# Patient Record
Sex: FEMALE | Race: WHITE | NOT HISPANIC OR LATINO | Employment: OTHER | URBAN - METROPOLITAN AREA
[De-identification: names, ages, dates, MRNs, and addresses within clinical notes are randomized per-mention and may not be internally consistent; named-entity substitution may affect disease eponyms.]

---

## 2021-10-07 ENCOUNTER — APPOINTMENT (OUTPATIENT)
Dept: WOMENS IMAGING | Facility: HOSPITAL | Age: 76
End: 2021-10-07

## 2021-10-07 PROCEDURE — 77067 SCR MAMMO BI INCL CAD: CPT | Performed by: RADIOLOGY

## 2022-07-28 RX ORDER — PAROXETINE HYDROCHLORIDE 20 MG/1
TABLET, FILM COATED ORAL
Qty: 53 TABLET | Refills: 0 | Status: SHIPPED | OUTPATIENT
Start: 2022-07-28 | End: 2022-08-15 | Stop reason: SDUPTHER

## 2022-08-15 ENCOUNTER — OFFICE VISIT (OUTPATIENT)
Dept: FAMILY MEDICINE CLINIC | Facility: CLINIC | Age: 77
End: 2022-08-15

## 2022-08-15 VITALS
DIASTOLIC BLOOD PRESSURE: 80 MMHG | TEMPERATURE: 97.8 F | HEART RATE: 76 BPM | BODY MASS INDEX: 25.34 KG/M2 | SYSTOLIC BLOOD PRESSURE: 126 MMHG | OXYGEN SATURATION: 95 % | HEIGHT: 60 IN | WEIGHT: 129.08 LBS

## 2022-08-15 DIAGNOSIS — E03.9 ACQUIRED HYPOTHYROIDISM: ICD-10-CM

## 2022-08-15 DIAGNOSIS — E55.9 VITAMIN D DEFICIENCY: ICD-10-CM

## 2022-08-15 DIAGNOSIS — F41.9 ANXIETY: ICD-10-CM

## 2022-08-15 DIAGNOSIS — Z12.31 ENCOUNTER FOR SCREENING MAMMOGRAM FOR MALIGNANT NEOPLASM OF BREAST: ICD-10-CM

## 2022-08-15 DIAGNOSIS — J30.1 SEASONAL ALLERGIC RHINITIS DUE TO POLLEN: ICD-10-CM

## 2022-08-15 DIAGNOSIS — Z00.00 ENCOUNTER FOR WELLNESS EXAMINATION IN ADULT: Primary | ICD-10-CM

## 2022-08-15 PROBLEM — R73.03 PREDIABETES: Status: ACTIVE | Noted: 2021-04-21

## 2022-08-15 PROCEDURE — 1170F FXNL STATUS ASSESSED: CPT | Performed by: FAMILY MEDICINE

## 2022-08-15 PROCEDURE — 1160F RVW MEDS BY RX/DR IN RCRD: CPT | Performed by: FAMILY MEDICINE

## 2022-08-15 PROCEDURE — G0439 PPPS, SUBSEQ VISIT: HCPCS | Performed by: FAMILY MEDICINE

## 2022-08-15 PROCEDURE — 99397 PER PM REEVAL EST PAT 65+ YR: CPT | Performed by: FAMILY MEDICINE

## 2022-08-15 RX ORDER — LEVOTHYROXINE SODIUM 0.05 MG/1
TABLET ORAL
COMMUNITY
Start: 2022-06-22

## 2022-08-15 RX ORDER — PAROXETINE HYDROCHLORIDE 20 MG/1
20 TABLET, FILM COATED ORAL DAILY
Qty: 90 TABLET | Refills: 1 | Status: SHIPPED | OUTPATIENT
Start: 2022-08-15 | End: 2023-03-20

## 2022-08-15 RX ORDER — ATORVASTATIN CALCIUM 20 MG/1
20 TABLET, FILM COATED ORAL NIGHTLY
COMMUNITY
Start: 2022-06-22

## 2022-08-15 RX ORDER — AZITHROMYCIN 250 MG/1
250 TABLET, FILM COATED ORAL 3 TIMES WEEKLY
COMMUNITY

## 2022-08-15 RX ORDER — FEXOFENADINE HCL 180 MG/1
180 TABLET ORAL DAILY
Qty: 90 TABLET | Refills: 3 | Status: SHIPPED | OUTPATIENT
Start: 2022-08-15

## 2022-08-15 NOTE — PROGRESS NOTES
QUICK REFERENCE INFORMATION:  The ABCs of the Annual Wellness Visit    Subsequent Medicare Wellness Visit    @[email or web address]@    HEALTH RISK ASSESSMENT    1945    Recent Hospitalizations:  No hospitalization(s) within the last year..        Current Medical Providers:  Patient Care Team:  Arlette Jensen DO as PCP - General (Family Medicine)  Arlette Jensen DO (Family Medicine)        Smoking Status:  Social History     Tobacco Use   Smoking Status Former Smoker   • Packs/day: 0.25   • Years: 3.00   • Pack years: 0.75   • Types: Cigarettes   • Quit date:    • Years since quittin.6   Smokeless Tobacco Never Used       Alcohol Consumption:  Social History     Substance and Sexual Activity   Alcohol Use Never       Depression Screen:   PHQ-2/PHQ-9 Depression Screening 8/15/2022   Little Interest or Pleasure in Doing Things 0-->not at all   Feeling Down, Depressed or Hopeless 0-->not at all   PHQ-9: Brief Depression Severity Measure Score 0       Health Habits and Functional and Cognitive Screening:  Functional & Cognitive Status 8/15/2022   Do you have difficulty preparing food and eating? No   Do you have difficulty bathing yourself, getting dressed or grooming yourself? No   Do you have difficulty using the toilet? No   Do you have difficulty moving around from place to place? No   Do you have trouble with steps or getting out of a bed or a chair? No   Current Diet Well Balanced Diet   Dental Exam Up to date   Eye Exam Up to date   Exercise (times per week) 7 times per week   Current Exercises Include Walking   Do you need help using the phone?  No   Are you deaf or do you have serious difficulty hearing?  No   Do you need help with transportation? No   Do you need help shopping? No   Do you need help preparing meals?  No   Do you need help with housework?  No   Do you need help with laundry? No   Do you need help taking your medications? No   Do you need help managing money? No   Do  you ever drive or ride in a car without wearing a seat belt? No   Have you felt unusual stress, anger or loneliness in the last month? No   Who do you live with? Spouse   If you need help, do you have trouble finding someone available to you? No   Have you been bothered in the last four weeks by sexual problems? No   Do you have difficulty concentrating, remembering or making decisions? No       Fall Risk Screen:  LON Fall Risk Assessment was completed, and patient is at LOW risk for falls.Assessment completed on:8/15/2022    ACE III MINI        Does the patient have evidence of cognitive impairment? No    Aspirin use counseling: Does not need ASA (and currently is not on it)    Recent Lab Results:  CMP:     HbA1c:  No results found for: HGBA1C  Microalbumin:  No results found for: MICROALBUR, POCMALB, POCCREAT  Lipid Panel  No results found for: CHOL, TRIG, HDL, LDL, AST, ALT    Visual Acuity:  No exam data present    Age-appropriate Screening Schedule:  Refer to the list below for future screening recommendations based on patient's age, sex and/or medical conditions. Orders for these recommended tests are listed in the plan section. The patient has been provided with a written plan.    Health Maintenance   Topic Date Due   • TDAP/TD VACCINES (1 - Tdap) Never done   • ZOSTER VACCINE (1 of 2) Never done   • LIPID PANEL  Never done   • INFLUENZA VACCINE  10/01/2022   • DXA SCAN  10/07/2023        Subjective   History of Present Illness    Susie Mancia is a 77 y.o. female who presents for a Subsequent Wellness Visit and annual physical.  Chronic medical conditions include depression, hyperlipidemia, and hypothyroidism.  She denies family history of colon, breast or ovarian cancer.  She is up-to-date on DEXA, mammogram and colon screening.  She would like to discuss a medication for allergies.    CHRONIC CONDITIONS    The following portions of the patient's history were reviewed and updated as appropriate:  allergies, current medications, past family history, past medical history, past social history, past surgical history and problem list.    Outpatient Medications Prior to Visit   Medication Sig Dispense Refill   • atorvastatin (LIPITOR) 20 MG tablet Take 20 mg by mouth Every Night.     • azithromycin (ZITHROMAX) 250 MG tablet Take 250 mg by mouth 3 (Three) Times a Week.     • levothyroxine (SYNTHROID, LEVOTHROID) 50 MCG tablet TAKE 1 TABLET BY MOUTH ONCE DAILY BEFORE BREAKFAST     • PARoxetine (PAXIL) 20 MG tablet Take 1 tablet by mouth once daily 53 tablet 0     No facility-administered medications prior to visit.       Patient Active Problem List   Diagnosis   • Anxiety   • Chest pain   • Gastroesophageal reflux disease   • Hyperlipidemia   • Hypothyroidism   • Irritable bowel syndrome   • Pancreas divisum   • Prediabetes   • Recurrent pancreatitis   • Seasonal allergies   • Encounter for wellness examination in adult   • Encounter for screening mammogram for malignant neoplasm of breast   • Vitamin D deficiency   • Seasonal allergic rhinitis due to pollen       Advance Care Planning:  ACP discussion was held with the patient during this visit. Patient has an advance directive (not in EMR), copy requested.    Identification of Risk Factors:  Risk factors include: Advance Directive Discussion  Breast Cancer/Mammogram Screening  Dementia/Memory   Fall Risk  Osteoporosis Risk.    Review of Systems   Constitutional: Negative for chills and fatigue.   Respiratory: Negative for cough, shortness of breath and wheezing.    Cardiovascular: Negative for chest pain and leg swelling.   Gastrointestinal: Negative for abdominal pain, constipation and diarrhea.   Skin: Negative for rash.   Neurological: Negative for dizziness and headaches.   Psychiatric/Behavioral: The patient is not nervous/anxious.        Compared to one year ago, the patient feels her physical health is the same.  Compared to one year ago, the patient feels  "her mental health is the same.    Objective     Physical Exam  Vitals and nursing note reviewed.   Constitutional:       Appearance: Normal appearance.   HENT:      Head: Normocephalic and atraumatic.   Cardiovascular:      Rate and Rhythm: Normal rate and regular rhythm.      Heart sounds: Normal heart sounds. No murmur heard.  Pulmonary:      Effort: Pulmonary effort is normal.      Breath sounds: Normal breath sounds. No wheezing.   Abdominal:      General: Bowel sounds are normal.      Palpations: Abdomen is soft.   Skin:     General: Skin is warm.   Neurological:      Mental Status: She is alert and oriented to person, place, and time. Mental status is at baseline.   Psychiatric:         Mood and Affect: Mood normal.         Behavior: Behavior normal.          Procedures     Vitals:    08/15/22 1506   BP: 126/80   BP Location: Left arm   Patient Position: Sitting   Cuff Size: Adult   Pulse: 76   Temp: 97.8 °F (36.6 °C)   TempSrc: Temporal   SpO2: 95%   Weight: 58.6 kg (129 lb 1.3 oz)   Height: 152.4 cm (60\")       BMI is >= 25 and <30. (Overweight) The following options were offered after discussion;: exercise counseling/recommendations and nutrition counseling/recommendations      No results found for: WBC, HGB, HCT, MCV, PLT  No results found for: GLUCOSE, BUN, CREATININE, EGFRIFNONA, EGFRIFAFRI, BCR, K, CO2, CALCIUM, PROTENTOTREF, ALBUMIN, LABIL2, BILIRUBIN, AST, ALT  No results found for: TSH  No results found for: PSA  No results found for: CHOL, CHLPL, TRIG, HDL, LDL, LDLDIRECT    Assessment & Plan   Problem List Items Addressed This Visit        Allergies and Adverse Reactions    Seasonal allergic rhinitis due to pollen    Current Assessment & Plan     Rx fexofenadine 180 mg daily.  Side effects discussed         Relevant Medications    fexofenadine (ALLEGRA) 180 MG tablet       Endocrine and Metabolic    Hypothyroidism    Current Assessment & Plan     Stable, she will return for lab work.  Followed by " endocrinology         Relevant Medications    levothyroxine (SYNTHROID, LEVOTHROID) 50 MCG tablet    Vitamin D deficiency    Relevant Orders    Vitamin D 25 Hydroxy       Genitourinary and Reproductive     Encounter for screening mammogram for malignant neoplasm of breast    Relevant Orders    Mammo Screening Bilateral With CAD       Health Encounters    Encounter for wellness examination in adult - Primary    Current Assessment & Plan     She will return for fasting lab work, up-to-date on health maintenance         Relevant Orders    Hemoglobin A1c    CBC Auto Differential    Comprehensive Metabolic Panel    Lipid Panel    TSH    T4, Free       Mental Health    Anxiety    Current Assessment & Plan     Stable on Paxil, refilled         Relevant Medications    PARoxetine (PAXIL) 20 MG tablet        Patient Self-Management and Personalized Health Advice  The patient has been provided with information about: diet, exercise, weight management and fall prevention and preventive services including:   · Annual Wellness Visit (AWV)  · Bone Density Measurements  · Colorectal Cancer Screening, Colonoscopy  · Screening Mammography .    Outpatient Encounter Medications as of 8/15/2022   Medication Sig Dispense Refill   • atorvastatin (LIPITOR) 20 MG tablet Take 20 mg by mouth Every Night.     • azithromycin (ZITHROMAX) 250 MG tablet Take 250 mg by mouth 3 (Three) Times a Week.     • levothyroxine (SYNTHROID, LEVOTHROID) 50 MCG tablet TAKE 1 TABLET BY MOUTH ONCE DAILY BEFORE BREAKFAST     • PARoxetine (PAXIL) 20 MG tablet Take 1 tablet by mouth Daily. 90 tablet 1   • [DISCONTINUED] PARoxetine (PAXIL) 20 MG tablet Take 1 tablet by mouth once daily 53 tablet 0   • fexofenadine (ALLEGRA) 180 MG tablet Take 1 tablet by mouth Daily. 90 tablet 3     No facility-administered encounter medications on file as of 8/15/2022.       Reviewed use of high risk medication in the elderly: yes  Reviewed for potential of harmful drug interactions  in the elderly: not applicable     Diagnoses and all orders for this visit:    1. Encounter for wellness examination in adult (Primary)  Assessment & Plan:  She will return for fasting lab work, up-to-date on health maintenance    Orders:  -     Hemoglobin A1c; Future  -     CBC Auto Differential; Future  -     Comprehensive Metabolic Panel; Future  -     Lipid Panel; Future  -     TSH; Future  -     T4, Free; Future    2. Acquired hypothyroidism  Assessment & Plan:  Stable, she will return for lab work.  Followed by endocrinology      3. Seasonal allergic rhinitis due to pollen  Assessment & Plan:  Rx fexofenadine 180 mg daily.  Side effects discussed    Orders:  -     fexofenadine (ALLEGRA) 180 MG tablet; Take 1 tablet by mouth Daily.  Dispense: 90 tablet; Refill: 3    4. Encounter for screening mammogram for malignant neoplasm of breast  -     Mammo Screening Bilateral With CAD; Future    5. Vitamin D deficiency  -     Vitamin D 25 Hydroxy; Future    6. Anxiety  Assessment & Plan:  Stable on Paxil, refilled    Orders:  -     PARoxetine (PAXIL) 20 MG tablet; Take 1 tablet by mouth Daily.  Dispense: 90 tablet; Refill: 1    Discussed injury prevention, diet and exercise, safe sexual practices, and screening for common diseases. Encouraged use of sunscreen and seatbelts. Encouraged SBE, avoidance of tobacco, limiting alcohol, and yearly dental and eye exams.       Follow Up:  Return in about 1 year (around 8/15/2023) for Annual physical.     There are no Patient Instructions on file for this visit.    An After Visit Summary and PPPS with all of these plans were given to the patient.

## 2022-08-19 PROCEDURE — 36415 COLL VENOUS BLD VENIPUNCTURE: CPT | Performed by: FAMILY MEDICINE

## 2022-08-20 LAB
25(OH)D3+25(OH)D2 SERPL-MCNC: 34.3 NG/ML (ref 30–100)
ALBUMIN SERPL-MCNC: 4.4 G/DL (ref 3.7–4.7)
ALBUMIN/GLOB SERPL: 1.6 {RATIO} (ref 1.2–2.2)
ALP SERPL-CCNC: 91 IU/L (ref 44–121)
ALT SERPL-CCNC: 12 IU/L (ref 0–32)
AST SERPL-CCNC: 24 IU/L (ref 0–40)
BASOPHILS # BLD AUTO: 0 X10E3/UL (ref 0–0.2)
BASOPHILS NFR BLD AUTO: 0 %
BILIRUB SERPL-MCNC: 0.3 MG/DL (ref 0–1.2)
BUN SERPL-MCNC: 21 MG/DL (ref 8–27)
BUN/CREAT SERPL: 21 (ref 12–28)
CALCIUM SERPL-MCNC: 9.6 MG/DL (ref 8.7–10.3)
CHLORIDE SERPL-SCNC: 105 MMOL/L (ref 96–106)
CHOLEST SERPL-MCNC: 157 MG/DL (ref 100–199)
CO2 SERPL-SCNC: 24 MMOL/L (ref 20–29)
CREAT SERPL-MCNC: 1.02 MG/DL (ref 0.57–1)
EGFRCR-CYS SERPLBLD CKD-EPI 2021: 57 ML/MIN/1.73
EOSINOPHIL # BLD AUTO: 0.2 X10E3/UL (ref 0–0.4)
EOSINOPHIL NFR BLD AUTO: 5 %
ERYTHROCYTE [DISTWIDTH] IN BLOOD BY AUTOMATED COUNT: 12.8 % (ref 11.7–15.4)
GLOBULIN SER CALC-MCNC: 2.7 G/DL (ref 1.5–4.5)
GLUCOSE SERPL-MCNC: 96 MG/DL (ref 65–99)
HBA1C MFR BLD: 6.3 % (ref 4.8–5.6)
HCT VFR BLD AUTO: 40.1 % (ref 34–46.6)
HDLC SERPL-MCNC: 52 MG/DL
HGB BLD-MCNC: 12.9 G/DL (ref 11.1–15.9)
IMM GRANULOCYTES # BLD AUTO: 0 X10E3/UL (ref 0–0.1)
IMM GRANULOCYTES NFR BLD AUTO: 0 %
LDLC SERPL CALC-MCNC: 80 MG/DL (ref 0–99)
LYMPHOCYTES # BLD AUTO: 1.5 X10E3/UL (ref 0.7–3.1)
LYMPHOCYTES NFR BLD AUTO: 32 %
MCH RBC QN AUTO: 29.7 PG (ref 26.6–33)
MCHC RBC AUTO-ENTMCNC: 32.2 G/DL (ref 31.5–35.7)
MCV RBC AUTO: 92 FL (ref 79–97)
MONOCYTES # BLD AUTO: 0.3 X10E3/UL (ref 0.1–0.9)
MONOCYTES NFR BLD AUTO: 7 %
NEUTROPHILS # BLD AUTO: 2.6 X10E3/UL (ref 1.4–7)
NEUTROPHILS NFR BLD AUTO: 56 %
PLATELET # BLD AUTO: 255 X10E3/UL (ref 150–450)
POTASSIUM SERPL-SCNC: 4.6 MMOL/L (ref 3.5–5.2)
PROT SERPL-MCNC: 7.1 G/DL (ref 6–8.5)
RBC # BLD AUTO: 4.34 X10E6/UL (ref 3.77–5.28)
SODIUM SERPL-SCNC: 145 MMOL/L (ref 134–144)
T4 FREE SERPL-MCNC: 1.03 NG/DL (ref 0.82–1.77)
TRIGL SERPL-MCNC: 142 MG/DL (ref 0–149)
TSH SERPL DL<=0.005 MIU/L-ACNC: 1.92 UIU/ML (ref 0.45–4.5)
VLDLC SERPL CALC-MCNC: 25 MG/DL (ref 5–40)
WBC # BLD AUTO: 4.6 X10E3/UL (ref 3.4–10.8)

## 2022-08-26 ENCOUNTER — TELEPHONE (OUTPATIENT)
Dept: FAMILY MEDICINE CLINIC | Facility: CLINIC | Age: 77
End: 2022-08-26

## 2023-03-19 DIAGNOSIS — F41.9 ANXIETY: ICD-10-CM

## 2023-03-20 RX ORDER — PAROXETINE HYDROCHLORIDE 20 MG/1
TABLET, FILM COATED ORAL
Qty: 90 TABLET | Refills: 0 | Status: SHIPPED | OUTPATIENT
Start: 2023-03-20

## 2023-04-06 ENCOUNTER — TELEPHONE (OUTPATIENT)
Dept: FAMILY MEDICINE CLINIC | Facility: CLINIC | Age: 78
End: 2023-04-06
Payer: MEDICARE

## 2023-04-06 NOTE — TELEPHONE ENCOUNTER
[facility] CALLED PT TO CANCEL APPT PROVIDER OUT OF OFFICE PLEASE RESCHEDULE PHONE NUMBER NOT WORKING

## 2023-05-24 ENCOUNTER — OFFICE VISIT (OUTPATIENT)
Dept: FAMILY MEDICINE CLINIC | Facility: CLINIC | Age: 78
End: 2023-05-24
Payer: MEDICARE

## 2023-05-24 VITALS
BODY MASS INDEX: 24.74 KG/M2 | DIASTOLIC BLOOD PRESSURE: 72 MMHG | WEIGHT: 126 LBS | HEIGHT: 60 IN | HEART RATE: 74 BPM | OXYGEN SATURATION: 96 % | SYSTOLIC BLOOD PRESSURE: 130 MMHG

## 2023-05-24 DIAGNOSIS — H65.91 FLUID LEVEL BEHIND TYMPANIC MEMBRANE OF RIGHT EAR: Primary | ICD-10-CM

## 2023-05-24 RX ORDER — TRIAMCINOLONE ACETONIDE 40 MG/ML
80 INJECTION, SUSPENSION INTRA-ARTICULAR; INTRAMUSCULAR ONCE
Status: COMPLETED | OUTPATIENT
Start: 2023-05-24 | End: 2023-05-24

## 2023-05-24 RX ORDER — ASPIRIN 81 MG/1
81 TABLET ORAL DAILY
COMMUNITY

## 2023-05-24 RX ADMIN — TRIAMCINOLONE ACETONIDE 80 MG: 40 INJECTION, SUSPENSION INTRA-ARTICULAR; INTRAMUSCULAR at 09:14

## 2023-05-24 NOTE — PROGRESS NOTES
Date: 2023   Patient Name: Susie Mancia  : 1945   MRN: [id number]     Chief Complaint:    Chief Complaint   Patient presents with   • Earache       History of Present Illness: Susie Mancia is a 78 y.o. female who is here today for Right ear pain.  She has struggled with this year and has history of surgery of the right ear as well.  She is currently being treated for a sinus infection which caused severe vertigo.  She is now feeling pressure, popping and cracking in her right ear.  She denies drainage from the ear, fever or chills, or lymphadenopathy.           Review of Systems:   Review of Systems   Constitutional: Negative for chills, fatigue and fever.   HENT: Positive for ear pain. Negative for ear discharge.    Respiratory: Negative for cough and shortness of breath.    Cardiovascular: Negative for chest pain and palpitations.   Gastrointestinal: Negative for abdominal pain, constipation, diarrhea, nausea and vomiting.   Musculoskeletal: Negative for back pain and myalgias.   Neurological: Negative for dizziness and headache.   Psychiatric/Behavioral: Negative for depressed mood. The patient is not nervous/anxious.        Past Medical History:   Past Medical History:   Diagnosis Date   • Hypercholesteremia    • Thyroiditis        Past Surgical History:   Past Surgical History:   Procedure Laterality Date   • BLADDER REPAIR     • HYSTERECTOMY     • NASAL SINUS SURGERY         Family History:   Family History   Problem Relation Age of Onset   • Hypertension Father    • Hypertension Sister    • Hypertension Brother        Social History:   Social History     Socioeconomic History   • Marital status:    Tobacco Use   • Smoking status: Former     Packs/day: 0.25     Years: 3.00     Pack years: 0.75     Types: Cigarettes     Quit date: 1966     Years since quittin.4   • Smokeless tobacco: Never   Vaping Use   • Vaping Use: Never used   Substance and Sexual Activity   • Alcohol  "use: Never   • Drug use: Never   • Sexual activity: Defer       Medications:     Current Outpatient Medications:   •  aspirin 81 MG EC tablet, Take 1 tablet by mouth Daily., Disp: , Rfl:   •  atorvastatin (LIPITOR) 20 MG tablet, Take 1 tablet by mouth Every Night., Disp: , Rfl:   •  azithromycin (ZITHROMAX) 250 MG tablet, Take 1 tablet by mouth 3 (Three) Times a Week., Disp: , Rfl:   •  fexofenadine (ALLEGRA) 180 MG tablet, Take 1 tablet by mouth Daily., Disp: 90 tablet, Rfl: 3  •  levothyroxine (SYNTHROID, LEVOTHROID) 50 MCG tablet, TAKE 1 TABLET BY MOUTH ONCE DAILY BEFORE BREAKFAST, Disp: , Rfl:   •  PARoxetine (PAXIL) 20 MG tablet, Take 1 tablet by mouth once daily, Disp: 90 tablet, Rfl: 0  No current facility-administered medications for this visit.    Allergies:   No Known Allergies      Physical Exam:  Vital Signs:   Vitals:    05/24/23 0842   BP: 130/72   BP Location: Left arm   Patient Position: Sitting   Cuff Size: Adult   Pulse: 74   SpO2: 96%   Weight: 57.2 kg (126 lb)   Height: 152.4 cm (60\")     Body mass index is 24.61 kg/m².     Physical Exam  Vitals and nursing note reviewed.   Constitutional:       Appearance: Normal appearance.   HENT:      Right Ear: Ear canal normal. A middle ear effusion is present. Tympanic membrane is scarred and retracted.      Left Ear: Tympanic membrane and ear canal normal.   Cardiovascular:      Rate and Rhythm: Normal rate and regular rhythm.      Heart sounds: Normal heart sounds. No murmur heard.  Pulmonary:      Effort: Pulmonary effort is normal.      Breath sounds: Normal breath sounds. No wheezing.   Neurological:      Mental Status: She is alert and oriented to person, place, and time. Mental status is at baseline.   Psychiatric:         Mood and Affect: Mood normal.         Behavior: Behavior normal.           Assessment/Plan:   Diagnoses and all orders for this visit:    1. Fluid level behind tympanic membrane of right ear (Primary)  Assessment & Plan:  Patient " received Kenalog in office and will continue her home antihistamine and steroid nasal spray.  She will call if symptoms are persistent or worsening    Orders:  -     triamcinolone acetonide (KENALOG-40) injection 80 mg         Follow Up:   Return if symptoms worsen or fail to improve.    Arlette Jensen,   [facility]

## 2023-05-24 NOTE — ASSESSMENT & PLAN NOTE
Patient received Kenalog in office and will continue her home antihistamine and steroid nasal spray.  She will call if symptoms are persistent or worsening

## 2023-09-08 ENCOUNTER — OFFICE VISIT (OUTPATIENT)
Dept: FAMILY MEDICINE CLINIC | Facility: CLINIC | Age: 78
End: 2023-09-08
Payer: MEDICARE

## 2023-09-08 VITALS
HEIGHT: 60 IN | HEART RATE: 85 BPM | TEMPERATURE: 97.5 F | BODY MASS INDEX: 23.46 KG/M2 | OXYGEN SATURATION: 98 % | WEIGHT: 119.5 LBS | SYSTOLIC BLOOD PRESSURE: 114 MMHG | DIASTOLIC BLOOD PRESSURE: 68 MMHG

## 2023-09-08 DIAGNOSIS — M62.81 GENERALIZED MUSCLE WEAKNESS: ICD-10-CM

## 2023-09-08 DIAGNOSIS — E03.9 ACQUIRED HYPOTHYROIDISM: ICD-10-CM

## 2023-09-08 DIAGNOSIS — E78.2 MIXED HYPERLIPIDEMIA: ICD-10-CM

## 2023-09-08 DIAGNOSIS — K86.1 CHRONIC RECURRENT PANCREATITIS: Primary | ICD-10-CM

## 2023-09-08 PROBLEM — I65.21 ASYMPTOMATIC STENOSIS OF RIGHT CAROTID ARTERY: Status: ACTIVE | Noted: 2023-07-26

## 2023-09-08 PROBLEM — I65.29 CAROTID STENOSIS: Status: ACTIVE | Noted: 2023-07-13

## 2023-09-08 PROCEDURE — 99214 OFFICE O/P EST MOD 30 MIN: CPT | Performed by: FAMILY MEDICINE

## 2023-09-08 PROCEDURE — 1159F MED LIST DOCD IN RCRD: CPT | Performed by: FAMILY MEDICINE

## 2023-09-08 PROCEDURE — 1160F RVW MEDS BY RX/DR IN RCRD: CPT | Performed by: FAMILY MEDICINE

## 2023-09-08 RX ORDER — EZETIMIBE 10 MG/1
10 TABLET ORAL DAILY
COMMUNITY
Start: 2023-08-21 | End: 2023-09-08

## 2023-09-08 NOTE — PROGRESS NOTES
Date: 2023   Patient Name: Susie Mancia  : 1945   MRN: [id number]     Chief Complaint:    Chief Complaint   Patient presents with    Abdominal Pain     Patient was hospitalized in  for pancreatitis, this is the 3rd time        History of Present Illness: Susie Mancia is a 78 y.o. female who is here today to follow up for recurrent pancreatitis.  She was last hospitalized in  which would be the third reoccurrence.  She feels that she has still not gotten her strength back and would like to discuss referral for physical therapy.    She is also due for repeat labs for hypothyroidism and hyperlipidemia.         Review of Systems:   Review of Systems   Constitutional:  Positive for fatigue. Negative for chills and fever.   Respiratory:  Negative for cough and shortness of breath.    Cardiovascular:  Negative for chest pain and palpitations.   Gastrointestinal:  Negative for abdominal pain, constipation, diarrhea, nausea and vomiting.   Musculoskeletal:  Negative for back pain and myalgias.   Neurological:  Positive for weakness. Negative for dizziness and headache.   Psychiatric/Behavioral:  Negative for depressed mood. The patient is not nervous/anxious.        Past Medical History:   Past Medical History:   Diagnosis Date    Hypercholesteremia     Pancreatitis     Thyroiditis        Past Surgical History:   Past Surgical History:   Procedure Laterality Date    BLADDER REPAIR      CAROTID ENDARTERECTOMY Right     HYSTERECTOMY      NASAL SINUS SURGERY         Family History:   Family History   Problem Relation Age of Onset    Hypertension Father     Hypertension Sister     Hypertension Brother        Social History:   Social History     Socioeconomic History    Marital status:    Tobacco Use    Smoking status: Former     Packs/day: 0.25     Years: 3.00     Additional pack years: 0.00     Total pack years: 0.75     Types: Cigarettes     Quit date: 1966     Years since  "quittin.8    Smokeless tobacco: Never    Tobacco comments:     2-3 per day ( not everyday)   Vaping Use    Vaping Use: Never used   Substance and Sexual Activity    Alcohol use: Never    Drug use: Never    Sexual activity: Not Currently     Partners: Male     Birth control/protection: Condom, Hysterectomy, Same-sex partner       Medications:     Current Outpatient Medications:     aspirin 81 MG EC tablet, Take 1 tablet by mouth Daily., Disp: , Rfl:     atorvastatin (LIPITOR) 20 MG tablet, Take 1 tablet by mouth Every Night., Disp: , Rfl:     azithromycin (ZITHROMAX) 250 MG tablet, Take 1 tablet by mouth 3 (Three) Times a Week., Disp: , Rfl:     levothyroxine (SYNTHROID, LEVOTHROID) 50 MCG tablet, TAKE 1 TABLET BY MOUTH ONCE DAILY BEFORE BREAKFAST, Disp: , Rfl:     PARoxetine (PAXIL) 20 MG tablet, Take 1 tablet by mouth once daily, Disp: 90 tablet, Rfl: 0    Allergies:   Allergies   Allergen Reactions    Morphine Itching       PHQ-2 Total Score:     PHQ-9 Total Score:       Physical Exam:  Vital Signs:   Vitals:    23 1434   BP: 114/68   BP Location: Left arm   Patient Position: Sitting   Cuff Size: Adult   Pulse: 85   Temp: 97.5 °F (36.4 °C)   TempSrc: Temporal   SpO2: 98%   Weight: 54.2 kg (119 lb 8 oz)   Height: 152.4 cm (60\")     Body mass index is 23.34 kg/m².     Physical Exam  Vitals and nursing note reviewed.   Constitutional:       Appearance: Normal appearance.   HENT:      Head: Normocephalic.   Pulmonary:      Effort: Pulmonary effort is normal.   Neurological:      Mental Status: She is alert and oriented to person, place, and time.   Psychiatric:         Mood and Affect: Mood normal.         Behavior: Behavior normal.           Assessment/Plan:   Diagnoses and all orders for this visit:    1. Chronic recurrent pancreatitis (Primary)  Assessment & Plan:  She is following with GI, referred to PT to help with strengthening from loss of muscle mass after recurrent hospitalizations    Orders:  -   "   Ambulatory Referral to Physical Therapy Evaluate and treat    2. Generalized muscle weakness  Assessment & Plan:  Referred to PT per patient request    Orders:  -     Ambulatory Referral to Physical Therapy Evaluate and treat    3. Mixed hyperlipidemia  Assessment & Plan:  Lipid abnormalities are improving with treatment.  Nutritional counseling was provided. and Pharmacotherapy as ordered.  Lipids will be reassessed in 6 months.    Orders:  -     Lipid Panel; Future    4. Acquired hypothyroidism  Assessment & Plan:  Stable, labs today.  Followed by endocrinology    Orders:  -     TSH; Future  -     T4, Free; Future           Follow Up:   Return in about 3 months (around 12/8/2023) for Medicare Wellness, Annual physical.    Arlette Jensen DO  [facility]

## 2023-09-18 ENCOUNTER — LAB (OUTPATIENT)
Dept: FAMILY MEDICINE CLINIC | Facility: CLINIC | Age: 78
End: 2023-09-18
Payer: MEDICARE

## 2023-09-18 DIAGNOSIS — E78.2 MIXED HYPERLIPIDEMIA: ICD-10-CM

## 2023-09-18 DIAGNOSIS — E03.9 ACQUIRED HYPOTHYROIDISM: ICD-10-CM

## 2023-09-18 PROCEDURE — 36415 COLL VENOUS BLD VENIPUNCTURE: CPT | Performed by: FAMILY MEDICINE

## 2023-09-19 LAB
CHOLEST SERPL-MCNC: 146 MG/DL (ref 100–199)
HDLC SERPL-MCNC: 65 MG/DL
LDLC SERPL CALC-MCNC: 60 MG/DL (ref 0–99)
T4 FREE SERPL-MCNC: 1.44 NG/DL (ref 0.82–1.77)
TRIGL SERPL-MCNC: 121 MG/DL (ref 0–149)
TSH SERPL DL<=0.005 MIU/L-ACNC: 1.6 UIU/ML (ref 0.45–4.5)
VLDLC SERPL CALC-MCNC: 21 MG/DL (ref 5–40)

## 2023-09-21 DIAGNOSIS — F41.9 ANXIETY: ICD-10-CM

## 2023-09-22 RX ORDER — PAROXETINE HYDROCHLORIDE 20 MG/1
20 TABLET, FILM COATED ORAL DAILY
Qty: 90 TABLET | Refills: 0 | Status: SHIPPED | OUTPATIENT
Start: 2023-09-22

## 2023-10-18 DIAGNOSIS — F41.9 ANXIETY: ICD-10-CM

## 2023-10-18 RX ORDER — PAROXETINE HYDROCHLORIDE 20 MG/1
20 TABLET, FILM COATED ORAL DAILY
Qty: 90 TABLET | Refills: 0 | Status: SHIPPED | OUTPATIENT
Start: 2023-10-18

## 2023-11-05 NOTE — ASSESSMENT & PLAN NOTE
She is following with GI, referred to PT to help with strengthening from loss of muscle mass after recurrent hospitalizations

## 2023-12-15 ENCOUNTER — OFFICE VISIT (OUTPATIENT)
Dept: FAMILY MEDICINE CLINIC | Facility: CLINIC | Age: 78
End: 2023-12-15
Payer: MEDICARE

## 2023-12-15 VITALS
WEIGHT: 121 LBS | HEART RATE: 76 BPM | BODY MASS INDEX: 23.75 KG/M2 | TEMPERATURE: 98.2 F | HEIGHT: 60 IN | DIASTOLIC BLOOD PRESSURE: 72 MMHG | OXYGEN SATURATION: 97 % | SYSTOLIC BLOOD PRESSURE: 122 MMHG

## 2023-12-15 DIAGNOSIS — H65.91 FLUID LEVEL BEHIND TYMPANIC MEMBRANE OF RIGHT EAR: Primary | ICD-10-CM

## 2023-12-15 DIAGNOSIS — J30.1 SEASONAL ALLERGIC RHINITIS DUE TO POLLEN: ICD-10-CM

## 2023-12-15 PROCEDURE — 99213 OFFICE O/P EST LOW 20 MIN: CPT | Performed by: FAMILY MEDICINE

## 2023-12-15 PROCEDURE — 1159F MED LIST DOCD IN RCRD: CPT | Performed by: FAMILY MEDICINE

## 2023-12-15 PROCEDURE — 1160F RVW MEDS BY RX/DR IN RCRD: CPT | Performed by: FAMILY MEDICINE

## 2023-12-15 RX ORDER — TRIAMCINOLONE ACETONIDE 40 MG/ML
80 INJECTION, SUSPENSION INTRA-ARTICULAR; INTRAMUSCULAR ONCE
Status: SHIPPED | OUTPATIENT
Start: 2023-12-15

## 2023-12-15 NOTE — PROGRESS NOTES
Date: 12/15/2023   Patient Name: Susie Mancia  : 1945   MRN: [id number]     Chief Complaint:    Chief Complaint   Patient presents with    Headache     With earaches for a week       History of Present Illness: Susie Mancia is a 78 y.o. female who is here today for headache and otalgia that began about 1 week ago.  She denies fever, chills, recent sick contacts.         Review of Systems:   Review of Systems   Constitutional:  Negative for chills, fatigue and fever.   HENT:  Positive for ear pain, postnasal drip and sinus pressure.    Respiratory:  Negative for cough and shortness of breath.    Cardiovascular:  Negative for chest pain and palpitations.   Gastrointestinal:  Negative for abdominal pain, constipation, diarrhea, nausea and vomiting.   Musculoskeletal:  Negative for back pain and myalgias.   Neurological:  Positive for headache. Negative for dizziness.   Psychiatric/Behavioral:  Negative for depressed mood. The patient is not nervous/anxious.        Past Medical History:   Past Medical History:   Diagnosis Date    Hypercholesteremia     Pancreatitis     Thyroiditis        Past Surgical History:   Past Surgical History:   Procedure Laterality Date    BLADDER REPAIR      CAROTID ENDARTERECTOMY Right     HYSTERECTOMY      NASAL SINUS SURGERY         Family History:   Family History   Problem Relation Age of Onset    Hypertension Father     Hypertension Sister     Hypertension Brother        Social History:   Social History     Socioeconomic History    Marital status:    Tobacco Use    Smoking status: Former     Packs/day: 0.25     Years: 3.00     Additional pack years: 0.00     Total pack years: 0.75     Types: Cigarettes     Quit date: 1966     Years since quittin.0    Smokeless tobacco: Never    Tobacco comments:     2-3 per day ( not everyday)   Vaping Use    Vaping Use: Never used   Substance and Sexual Activity    Alcohol use: Never    Drug use: Never    Sexual  "activity: Not Currently     Partners: Male     Birth control/protection: Condom, Hysterectomy, Same-sex partner       Medications:     Current Outpatient Medications:     aspirin 81 MG EC tablet, Take 1 tablet by mouth Daily., Disp: , Rfl:     atorvastatin (LIPITOR) 20 MG tablet, Take 1 tablet by mouth Every Night., Disp: , Rfl:     azithromycin (ZITHROMAX) 250 MG tablet, Take 1 tablet by mouth 3 (Three) Times a Week., Disp: , Rfl:     levothyroxine (SYNTHROID, LEVOTHROID) 50 MCG tablet, TAKE 1 TABLET BY MOUTH ONCE DAILY BEFORE BREAKFAST, Disp: , Rfl:     PARoxetine (PAXIL) 20 MG tablet, Take 1 tablet by mouth once daily, Disp: 90 tablet, Rfl: 0    Current Facility-Administered Medications:     triamcinolone acetonide (KENALOG-40) injection 80 mg, 80 mg, Intramuscular, Once, Arlette Jensen,     Allergies:   Allergies   Allergen Reactions    Morphine Itching         Physical Exam:  Vital Signs:   Vitals:    12/15/23 1038   BP: 122/72   Pulse: 76   Temp: 98.2 °F (36.8 °C)   TempSrc: Oral   SpO2: 97%   Weight: 54.9 kg (121 lb)   Height: 152.4 cm (60\")     Body mass index is 23.63 kg/m².     Physical Exam  Vitals and nursing note reviewed.   Constitutional:       Appearance: Normal appearance.   HENT:      Right Ear: A middle ear effusion is present. Tympanic membrane is retracted.      Left Ear: A middle ear effusion is present. Tympanic membrane is retracted.      Nose:      Right Sinus: No maxillary sinus tenderness or frontal sinus tenderness.      Left Sinus: No maxillary sinus tenderness or frontal sinus tenderness.      Mouth/Throat:      Pharynx: Oropharynx is clear.   Cardiovascular:      Rate and Rhythm: Normal rate and regular rhythm.      Heart sounds: Normal heart sounds. No murmur heard.  Pulmonary:      Effort: Pulmonary effort is normal.      Breath sounds: Normal breath sounds. No wheezing.   Neurological:      Mental Status: She is alert and oriented to person, place, and time. Mental " status is at baseline.   Psychiatric:         Mood and Affect: Mood normal.         Behavior: Behavior normal.           Assessment/Plan:   Diagnoses and all orders for this visit:    1. Fluid level behind tympanic membrane of right ear (Primary)  Assessment & Plan:  No evidence of infection at this time, patient received Kenalog in office today and will modify allergy medications as instructed    Orders:  -     triamcinolone acetonide (KENALOG-40) injection 80 mg    2. Seasonal allergic rhinitis due to pollen  Assessment & Plan:  Patient will change her antihistamine and was instructed to continue intranasal steroid.  She received Kenalog in office today    Orders:  -     triamcinolone acetonide (KENALOG-40) injection 80 mg           Follow Up:   Return if symptoms worsen or fail to improve.    Arlette Jensen, DO  [facility]

## 2024-01-01 NOTE — ASSESSMENT & PLAN NOTE
No evidence of infection at this time, patient received Kenalog in office today and will modify allergy medications as instructed

## 2024-04-11 DIAGNOSIS — F41.9 ANXIETY: ICD-10-CM

## 2024-04-11 RX ORDER — PAROXETINE HYDROCHLORIDE 20 MG/1
20 TABLET, FILM COATED ORAL DAILY
Qty: 90 TABLET | Refills: 0 | Status: SHIPPED | OUTPATIENT
Start: 2024-04-11

## 2024-07-06 DIAGNOSIS — F41.9 ANXIETY: ICD-10-CM

## 2024-07-08 RX ORDER — PAROXETINE HYDROCHLORIDE 20 MG/1
20 TABLET, FILM COATED ORAL DAILY
Qty: 90 TABLET | Refills: 1 | Status: SHIPPED | OUTPATIENT
Start: 2024-07-08

## 2024-08-08 ENCOUNTER — OFFICE VISIT (OUTPATIENT)
Dept: FAMILY MEDICINE CLINIC | Facility: CLINIC | Age: 79
End: 2024-08-08
Payer: MEDICARE

## 2024-08-08 ENCOUNTER — TELEPHONE (OUTPATIENT)
Dept: FAMILY MEDICINE CLINIC | Facility: CLINIC | Age: 79
End: 2024-08-08
Payer: MEDICARE

## 2024-08-08 VITALS
HEIGHT: 60 IN | HEART RATE: 86 BPM | OXYGEN SATURATION: 98 % | SYSTOLIC BLOOD PRESSURE: 122 MMHG | WEIGHT: 122 LBS | DIASTOLIC BLOOD PRESSURE: 90 MMHG | BODY MASS INDEX: 23.95 KG/M2

## 2024-08-08 DIAGNOSIS — Z82.49 FAMILY HISTORY OF CHRONIC ISCHEMIC HEART DISEASE: ICD-10-CM

## 2024-08-08 DIAGNOSIS — R53.1 WEAKNESS: ICD-10-CM

## 2024-08-08 DIAGNOSIS — R07.9 CHEST PAIN, UNSPECIFIED TYPE: Primary | ICD-10-CM

## 2024-08-08 DIAGNOSIS — R06.00 DYSPNEA, UNSPECIFIED TYPE: ICD-10-CM

## 2024-08-08 RX ORDER — FLUTICASONE PROPIONATE 50 MCG
SPRAY, SUSPENSION (ML) NASAL
COMMUNITY
Start: 2024-05-15

## 2024-08-08 RX ORDER — AMOXICILLIN 500 MG/1
500 CAPSULE ORAL 2 TIMES DAILY
COMMUNITY
Start: 2024-08-07

## 2024-08-08 RX ORDER — PREDNISONE 20 MG/1
TABLET ORAL
COMMUNITY
Start: 2024-08-07

## 2024-08-08 NOTE — TELEPHONE ENCOUNTER
Caller: Susie Mancia    Relationship: Self    Best call back number: [phone]    Who is your current provider: DR WEN BRISCOE    Is your current provider offboarding? NO     Who would you like your new provider to be: DR ANIBAL SHANE     What are your reasons for transferring care: CURRENT PCP IS NEVER AVAILABLE     Additional notes: PATIENT WOULD LIKE DR SHANE TO TAKE OVER HER CARE. PLEASE ADVISE

## 2024-08-08 NOTE — PROGRESS NOTES
Follow Up Office Visit      Patient Name: Susie Mancia  : 1945   MRN: [id number]     Chief Complaint:    Chief Complaint   Patient presents with    Sinus Problem     Has had sinus symptoms for about 6 weeks and feels very fatigued no energy. Seen  yesterday and was put on a steroid and antibiotic but he seemed to think it was possibly her heart so she is here to follow up        History of Present Illness: Susie Mancia is a 79 y.o. female who is here today for follow up with shortness of breath, chest pain, weakness for the past several days.  Patient saw ENT and was placed on antibiotic and steroid but recommended cardiac workup to rule out cardiac etiology.  Patient states she has severe exercise intolerance.    Subjective      Review of Systems:   Review of Systems   Constitutional:  Positive for fatigue and fever.   HENT:  Positive for congestion.    Respiratory:  Positive for chest tightness and shortness of breath.    Cardiovascular:  Positive for chest pain.   Neurological:  Positive for weakness.       The following portions of the patient's history were reviewed and updated as appropriate: allergies, current medications, past family history, past medical history, past social history, past surgical history and problem list.    Medications:     Current Outpatient Medications:     amoxicillin (AMOXIL) 500 MG capsule, Take 1 capsule by mouth 2 (Two) Times a Day., Disp: , Rfl:     aspirin 81 MG EC tablet, Take 1 tablet by mouth Daily., Disp: , Rfl:     atorvastatin (LIPITOR) 20 MG tablet, Take 1 tablet by mouth Every Night., Disp: , Rfl:     fluticasone (FLONASE) 50 MCG/ACT nasal spray, USE 1 SPRAY(S) IN EACH NOSTRIL TWICE DAILY, Disp: , Rfl:     levothyroxine (SYNTHROID, LEVOTHROID) 50 MCG tablet, TAKE 1 TABLET BY MOUTH ONCE DAILY BEFORE BREAKFAST, Disp: , Rfl:     PARoxetine (PAXIL) 20 MG tablet, Take 1 tablet by mouth once daily, Disp: 90 tablet, Rfl: 1    predniSONE (DELTASONE) 20 MG  "tablet, , Disp: , Rfl:     azithromycin (ZITHROMAX) 250 MG tablet, Take 1 tablet by mouth 3 (Three) Times a Week. (Patient not taking: Reported on 8/8/2024), Disp: , Rfl:   No current facility-administered medications for this visit.    Allergies:   Allergies   Allergen Reactions    Morphine Itching       Objective     Physical Exam:  Vital Signs:   Vitals:    08/08/24 1410   BP: 122/90   Pulse: 86   SpO2: 98%   Weight: 55.3 kg (122 lb)   Height: 152.4 cm (60\")     Body mass index is 23.83 kg/m².   Facility age limit for growth %shelby is 20 years.    Physical Exam  Vitals and nursing note reviewed.   Constitutional:       General: She is not in acute distress.     Appearance: Normal appearance. She is not ill-appearing or toxic-appearing.   HENT:      Head: Normocephalic and atraumatic.   Cardiovascular:      Rate and Rhythm: Normal rate and regular rhythm.   Pulmonary:      Effort: Pulmonary effort is normal.      Breath sounds: Decreased air movement present.   Neurological:      Mental Status: She is alert.           ECG 12 Lead    Date/Time: 8/8/2024 3:08 PM  Performed by: Isidro Michelle MD    Authorized by: Isidro Michelle MD  Comparison: not compared with previous ECG   Rhythm: sinus rhythm  Rate: normal  Conduction: conduction normal  ST Segments: ST segments normal  Other: no other findings    Clinical impression: non-specific ECG          PHQ-9 Total Score:       Assessment / Plan      Assessment/Plan:   Assessment & Plan  Chest pain, unspecified type    Dyspnea, unspecified type    Weakness    Family history of chronic ischemic heart disease      Orders Placed This Encounter   Procedures    XR Chest PA & Lateral    Basic Metabolic Panel    BNP ([facility] Only)    CBC Auto Differential    Troponin T ([facility])    D-dimer, Quantitative    Ambulatory Referral to Cardiology    ECG 12 Lead        Etiology unclear and prognosis unknown.  Patient has a very strong history of CAD.  Differential Dx " includes cardiac etiology, pulmonary/infectious, PE.  EKG is reassuring today.  Will order labs, chest x-ray, cardiology referral.  I recommended if symptoms worsen, go to the ER for more extensive evaluation or call 911.      BMI is within normal parameters. No other follow-up for BMI required.      Follow Up:   Return in about 1 week (around 8/15/2024).      JOSE DAVID Michelle MD  [facility]

## 2024-08-09 ENCOUNTER — TELEPHONE (OUTPATIENT)
Dept: CARDIOLOGY | Facility: CLINIC | Age: 79
End: 2024-08-09
Payer: MEDICARE

## 2024-08-09 LAB
BASOPHILS # BLD AUTO: 0 X10E3/UL (ref 0–0.2)
BASOPHILS NFR BLD AUTO: 0 %
BNP SERPL-MCNC: 15 PG/ML (ref 0–100)
BUN SERPL-MCNC: 15 MG/DL (ref 8–27)
BUN/CREAT SERPL: 16 (ref 12–28)
CALCIUM SERPL-MCNC: 10 MG/DL (ref 8.7–10.3)
CHLORIDE SERPL-SCNC: 102 MMOL/L (ref 96–106)
CO2 SERPL-SCNC: 22 MMOL/L (ref 20–29)
CREAT SERPL-MCNC: 0.92 MG/DL (ref 0.57–1)
D DIMER PPP FEU-MCNC: 0.91 MG/L FEU (ref 0–0.49)
EGFRCR SERPLBLD CKD-EPI 2021: 64 ML/MIN/1.73
EOSINOPHIL # BLD AUTO: 0.1 X10E3/UL (ref 0–0.4)
EOSINOPHIL NFR BLD AUTO: 1 %
ERYTHROCYTE [DISTWIDTH] IN BLOOD BY AUTOMATED COUNT: 12 % (ref 11.7–15.4)
GLUCOSE SERPL-MCNC: 162 MG/DL (ref 70–99)
HCT VFR BLD AUTO: 41.9 % (ref 34–46.6)
HGB BLD-MCNC: 13.7 G/DL (ref 11.1–15.9)
IMM GRANULOCYTES # BLD AUTO: 0 X10E3/UL (ref 0–0.1)
IMM GRANULOCYTES NFR BLD AUTO: 1 %
LYMPHOCYTES # BLD AUTO: 0.7 X10E3/UL (ref 0.7–3.1)
LYMPHOCYTES NFR BLD AUTO: 9 %
MCH RBC QN AUTO: 29.5 PG (ref 26.6–33)
MCHC RBC AUTO-ENTMCNC: 32.7 G/DL (ref 31.5–35.7)
MCV RBC AUTO: 90 FL (ref 79–97)
MONOCYTES # BLD AUTO: 0.6 X10E3/UL (ref 0.1–0.9)
MONOCYTES NFR BLD AUTO: 7 %
NEUTROPHILS # BLD AUTO: 6.9 X10E3/UL (ref 1.4–7)
NEUTROPHILS NFR BLD AUTO: 82 %
PLATELET # BLD AUTO: 318 X10E3/UL (ref 150–450)
POTASSIUM SERPL-SCNC: 4.2 MMOL/L (ref 3.5–5.2)
RBC # BLD AUTO: 4.64 X10E6/UL (ref 3.77–5.28)
SODIUM SERPL-SCNC: 140 MMOL/L (ref 134–144)
TROPONIN T SERPL HS-MCNC: <6 NG/L (ref 0–14)
WBC # BLD AUTO: 8.3 X10E3/UL (ref 3.4–10.8)

## 2024-08-09 NOTE — TELEPHONE ENCOUNTER
CALLED PATIENT AFTER I SPOKE TO DR SHANE.  HE AGREED TO BE HER NEW PCP . CHANGED IN SYSTEM FOR HIM TO BE HER NEW PCP

## 2024-08-13 ENCOUNTER — TELEPHONE (OUTPATIENT)
Dept: FAMILY MEDICINE CLINIC | Facility: CLINIC | Age: 79
End: 2024-08-13
Payer: MEDICARE

## 2024-08-13 NOTE — TELEPHONE ENCOUNTER
Caller: Susie Mancia    Relationship to patient: Self    Best call back number: [phone]     PATIENT IS CALLING TO CHECK THE STAT US OF HER CARDIOLOGY APPOINTMENT.

## 2024-08-13 NOTE — TELEPHONE ENCOUNTER
CALLED PATIENT AND INFORMED HER THAT CARDIOLOGY HAS BEEN TRYING TO GET AHOLD OF HER SO I GAVE HER CARDIOLOGY'S PHONE NUMBER SO SHE CAN REACH OUT, PATIENT STATED SHE WAS GOING TO CALL

## 2024-08-15 ENCOUNTER — OFFICE VISIT (OUTPATIENT)
Dept: CARDIOLOGY | Facility: CLINIC | Age: 79
End: 2024-08-15
Payer: MEDICARE

## 2024-08-15 ENCOUNTER — OUTSIDE FACILITY SERVICE (OUTPATIENT)
Dept: CARDIOLOGY | Facility: CLINIC | Age: 79
End: 2024-08-15
Payer: MEDICARE

## 2024-08-15 ENCOUNTER — TELEPHONE (OUTPATIENT)
Dept: CARDIOLOGY | Facility: CLINIC | Age: 79
End: 2024-08-15

## 2024-08-15 VITALS
WEIGHT: 122.2 LBS | RESPIRATION RATE: 14 BRPM | OXYGEN SATURATION: 98 % | HEIGHT: 60 IN | SYSTOLIC BLOOD PRESSURE: 124 MMHG | HEART RATE: 104 BPM | DIASTOLIC BLOOD PRESSURE: 72 MMHG | BODY MASS INDEX: 23.99 KG/M2

## 2024-08-15 DIAGNOSIS — R07.9 CHEST PAIN, UNSPECIFIED TYPE: Primary | ICD-10-CM

## 2024-08-15 DIAGNOSIS — R06.02 SHORTNESS OF BREATH: ICD-10-CM

## 2024-08-15 DIAGNOSIS — E78.2 MIXED HYPERLIPIDEMIA: ICD-10-CM

## 2024-08-15 DIAGNOSIS — R94.31 ABNORMAL EKG: ICD-10-CM

## 2024-08-15 DIAGNOSIS — Z98.890 HISTORY OF RIGHT-SIDED CAROTID ENDARTERECTOMY: ICD-10-CM

## 2024-08-15 PROCEDURE — 93306 TTE W/DOPPLER COMPLETE: CPT | Performed by: INTERNAL MEDICINE

## 2024-08-15 PROCEDURE — 93000 ELECTROCARDIOGRAM COMPLETE: CPT | Performed by: INTERNAL MEDICINE

## 2024-08-15 PROCEDURE — 1159F MED LIST DOCD IN RCRD: CPT | Performed by: INTERNAL MEDICINE

## 2024-08-15 PROCEDURE — 1160F RVW MEDS BY RX/DR IN RCRD: CPT | Performed by: INTERNAL MEDICINE

## 2024-08-15 PROCEDURE — 99204 OFFICE O/P NEW MOD 45 MIN: CPT | Performed by: INTERNAL MEDICINE

## 2024-08-15 NOTE — ASSESSMENT & PLAN NOTE
Differential includes LVH with secondary CT changes versus ischemic heart disease.  Cardiac workup with TTE and stress testing.

## 2024-08-15 NOTE — ASSESSMENT & PLAN NOTE
Lipid abnormalities are improving with treatment    Plan:  Continue same medication/s without change.  Atorvastatin 20 mg p.o. daily    Discussed medication dosage, use, side effects, and goals of treatment in detail.    Counseled patient on lifestyle modifications to help control hyperlipidemia.   Cholesterol lowering dietary information shared with patient.  Advised patient to exercise for 150 minutes weekly. (30 minute brisk walk, 5 days a week for example)    Patient Treatment Goals:   LDL goal is less than 70    Followup in 6 months.

## 2024-08-15 NOTE — ASSESSMENT & PLAN NOTE
No bruit on exam bilaterally.  Continue aspirin and atorvastatin for secondary prevention of cardiovascular diseases.  Consider ultrasound carotid in 2 years for follow-up.

## 2024-08-15 NOTE — TELEPHONE ENCOUNTER
STAT ECHO scheduled at [facility] 8/15/24 2:00, 1:30 arrival. Patient was given orders to take with her.

## 2024-08-15 NOTE — PROGRESS NOTES
[facility]  [facility] CARDIOLOGY  [address] [place] [address]  Dept: [phone]  Dept Fax: [phone]    Date: 08/15/2024  Patient: Susie Mancia  YOB: 1945    New Patient Office Note    Consult Reason:  Ms. Susie Mancia is a 79 y.o. female who presents to the clinic to establish care, seen for Chest Pain and Shortness of Breath.   Patient will office for severe shortness of breath, patient unable to finish a sentence and intermittent chest pains, not in the office, radiating to the neck and back, lasting seconds to minutes, no specific trigger.  She has been experiencing the symptoms for the last 1 month.  Workup done at the clinic showing positive D-dimer seen prior ER visit with a negative CTA of the chest, no mention of cardiac calcifications, mild atelectasis and bronchiectasis, otherwise negative.  In office patient with pulse ox 96% fast heart rate with shallow rapid breathing.  Patient denies orthopnea, PND, palpitations, lightheadedness, syncope or medications side-effects.    The following portions of the patient's history were reviewed and updated as appropriate: allergies, current medications, past family history, past medical history, past social history, past surgical history, and problem list.    Medications:   Allergies   Allergen Reactions    Morphine Itching      Current Outpatient Medications   Medication Instructions    amoxicillin (AMOXIL) 500 mg, Oral, 2 Times Daily    aspirin 81 mg, Oral, Daily    atorvastatin (LIPITOR) 20 mg, Oral, Nightly    azithromycin (ZITHROMAX) 250 mg, 3 Times Weekly    fluticasone (FLONASE) 50 MCG/ACT nasal spray USE 1 SPRAY(S) IN EACH NOSTRIL TWICE DAILY    levothyroxine (SYNTHROID, LEVOTHROID) 50 MCG tablet TAKE 1 TABLET BY MOUTH ONCE DAILY BEFORE BREAKFAST    metoprolol tartrate (LOPRESSOR) 12.5 mg, Oral, 2 Times Daily    PARoxetine (PAXIL) 20 mg, Oral, Daily    predniSONE (DELTASONE) 20 MG tablet   "      Subjective  Past Medical History:   Diagnosis Date    Hypercholesteremia     Pancreatitis /    Thyroiditis        Past Surgical History:   Procedure Laterality Date    BLADDER REPAIR      CAROTID ENDARTERECTOMY Right     HYSTERECTOMY      NASAL SINUS SURGERY         Family History   Problem Relation Age of Onset    Hypertension Father     Hypertension Sister     Hypertension Brother         Social History     Socioeconomic History    Marital status:    Tobacco Use    Smoking status: Former     Current packs/day: 0.00     Average packs/day: 0.3 packs/day for 3.0 years (0.8 ttl pk-yrs)     Types: Cigarettes     Start date: 1963     Quit date: 1966     Years since quittin.6    Smokeless tobacco: Never    Tobacco comments:     2-3 per day ( not everyday)   Vaping Use    Vaping status: Never Used   Substance and Sexual Activity    Alcohol use: Never    Drug use: Never    Sexual activity: Not Currently     Partners: Male     Birth control/protection: Hysterectomy       Objective  Vitals:    08/15/24 0956   BP: 124/72   BP Location: Left arm   Patient Position: Sitting   Cuff Size: Adult   Pulse: 104   Resp: 14   SpO2: 98%   Weight: 55.4 kg (122 lb 3.2 oz)   Height: 152.4 cm (60\")     Vitals:    08/15/24 0956   BP: 124/72   BP Location: Left arm   Patient Position: Sitting   Cuff Size: Adult   Pulse: 104   Resp: 14   SpO2: 98%   Weight: 55.4 kg (122 lb 3.2 oz)   Height: 152.4 cm (60\")        Physical Exam  Constitutional:       Appearance: Healthy appearance. Not in distress.   Eyes:      Pupils: Pupils are equal, round, and reactive to light.   HENT:    Mouth/Throat:      Mouth: Mucous membranes are moist.   Neck:      Vascular: No carotid bruit, hepatojugular reflux, JVD or JVR. JVD normal.   Pulmonary:      Effort: Pulmonary effort is normal.      Breath sounds: Normal breath sounds. No wheezing. No rhonchi. No rales.   Chest:      Chest wall: Not tender to palpatation.   Cardiovascular:      " PMI at left midclavicular line. Normal rate. Regular rhythm. Normal S1 with normal intensity. Normal S2 with normal intensity.       Murmurs: There is no murmur.      No gallop.  No click. No rub.   Pulses:     Carotid: 4+ bilaterally.     Radial: 4+ bilaterally.     Popliteal: 4+ bilaterally.     Dorsalis pedis: 4+ bilaterally.  Edema:     Peripheral edema absent.   Abdominal:      General: There is no abdominal bruit.   Skin:     General: Skin is warm.   Neurological:      Mental Status: Alert and oriented to person, place and time.              Labs:  Lab Results   Component Value Date     08/08/2024    K 4.2 08/08/2024     08/08/2024    CO2 22 08/08/2024    BUN 15 08/08/2024    CREATININE 0.92 08/08/2024    CALCIUM 10.0 08/08/2024    BILITOT 0.3 08/19/2022    ALKPHOS 91 08/19/2022    ALT 12 08/19/2022    AST 24 08/19/2022    GLUCOSE 162 (H) 08/08/2024    ALBUMIN 4.4 08/19/2022     Lab Results   Component Value Date    WBC 8.3 08/08/2024    HGB 13.7 08/08/2024    HCT 41.9 08/08/2024     08/08/2024     Lab Results   Component Value Date    INR 1.0 07/27/2023    PTT 26.1 07/27/2023     Lab Results   Component Value Date    TROPONINT <6 08/08/2024    BNP 15.0 08/08/2024     Lab Results   Component Value Date    BNP 15.0 08/08/2024       Lab Results   Component Value Date    CHLPL 154.0 05/22/2024    TRIG 121 09/18/2023    HDL 59 05/22/2024    LDL 70.8 05/22/2024     Lab Results   Component Value Date    TSH 1.58 05/22/2024    FREET4 1.2 05/22/2024       The 10-year ASCVD risk score (Sanjeev HWANG, et al., 2019) is: 21%    Values used to calculate the score:      Age: 79 years      Sex: Female      Is Non- : No      Diabetic: No      Tobacco smoker: No      Systolic Blood Pressure: 124 mmHg      Is BP treated: No      HDL Cholesterol: 59 mg/dL      Total Cholesterol: 154 mg/dL     [facility] Diagn[facility]:    ECG 12 Lead    Date/Time: 8/15/2024 10:26 AM  Performed by: Renaldo Alonzo,  MD    Authorized by: Renaldo Alonzo MD  Comparison: compared with previous ECG   Similar to previous ECG          CXR: Results for orders placed in visit on 08/08/24    XR Chest PA & Lateral    Narrative  XR CHEST PA AND LATERAL    Date of Exam: 8/8/2024 3:33 PM EDT    Indication: dyspnea, weakness    Comparison: None available.    Findings:  Normal cardiomediastinal silhouette. The lungs are clear. No pleural effusion or pneumothorax. No acute osseous findings.    Impression  Impression:  No acute cardiopulmonary findings.        Electronically Signed: Jamshid Villanueva MD  8/8/2024 4:49 PM EDT  Workstation ID: [id number]     ECHO/MUGA: No results found for this or any previous visit.     STRESS TESTS: No results found for this or any previous visit.     CARDIAC CATH: No results found for this or any previous visit.     DEVICES: No valid procedures specified.   HOLTER: No results found for this or any previous visit.     CT/MRI:  No results found for this or any previous visit.    VASCULAR: No valid procedures specified.     Assessment and Plan  Diagnoses and all orders for this visit:    1. Chest pain, unspecified type (Primary)  Assessment & Plan:  Chest pain, atypical.  Family history of CAD.  Hyperlipidemia.  On aspirin and statin with LDL to goal.  Blood pressure controlled.  Symptoms occurring for 1 month now associated with severe shortness of breath.  EKG showing normal sinus rhythm, possible LVH with nonspecific ST-T changes.  Discrepancy between physical exam findings labs and symptoms reported.  Differential includes coronary artery disease, CHF, panic attack, other.  Plan:  Lexiscan nuclear stress test rule out CAD  Expedite transthoracic echocardiogram in view of shortness of breath  Continue aspirin and atorvastatin 20 mg p.o. daily  Start low-dose beta-blockers metoprolol tartrate 25 mg half tab twice daily for fast heart rate/CV prevention in the setting of angina    Orders:  -     Stress Test With  Myocardial Perfusion One Day; Future  -     ECG 12 Lead    2. Mixed hyperlipidemia  Assessment & Plan:   Lipid abnormalities are improving with treatment    Plan:  Continue same medication/s without change.  Atorvastatin 20 mg p.o. daily    Discussed medication dosage, use, side effects, and goals of treatment in detail.    Counseled patient on lifestyle modifications to help control hyperlipidemia.   Cholesterol lowering dietary information shared with patient.  Advised patient to exercise for 150 minutes weekly. (30 minute brisk walk, 5 days a week for example)    Patient Treatment Goals:   LDL goal is less than 70    Followup in 6 months.      3. Shortness of breath  Assessment & Plan:  Unclear etiology of her shortness of breath at rest, limiting her conversation.  CTA chest negative for PE or severe lung disease.  No coronary calcifications mentioned on report.  Pulse ox 96% on room air in the office.  Lung exam normal.  proBNP normal troponin negative.  Differential includes severe CAD, CHF, panic attack, noncardiac etiology of pain such as referred abdominal pain, other.  Plan:  Expedited transthoracic echocardiogram  Stress test rule out CAD  Start low-dose beta-blockers for cardiovascular prevention in view of risk of severe CAD (carotid endarterectomy, chest pain, nonspecific EKG changes, negative workup for other etiologies)    Orders:  -     Adult Transthoracic Echo Complete W/ Cont if Necessary Per Protocol; Future    4. Abnormal EKG  Assessment & Plan:  Differential includes LVH with secondary CT changes versus ischemic heart disease.  Cardiac workup with TTE and stress testing.    Orders:  -     Stress Test With Myocardial Perfusion One Day; Future    5. History of right-sided carotid endarterectomy  Assessment & Plan:  No bruit on exam bilaterally.  Continue aspirin and atorvastatin for secondary prevention of cardiovascular diseases.  Consider ultrasound carotid in 2 years for follow-up.      Other  orders  -     metoprolol tartrate (LOPRESSOR) 25 MG tablet; Take 0.5 tablets by mouth 2 (Two) Times a Day.  Dispense: 90 tablet; Refill: 3         No follow-ups on file.    There are no Patient Instructions on file for this visit.    Renaldo Alonzo MD

## 2024-08-15 NOTE — ASSESSMENT & PLAN NOTE
Chest pain, atypical.  Family history of CAD.  Hyperlipidemia.  On aspirin and statin with LDL to goal.  Blood pressure controlled.  Symptoms occurring for 1 month now associated with severe shortness of breath.  EKG showing normal sinus rhythm, possible LVH with nonspecific ST-T changes.  Discrepancy between physical exam findings labs and symptoms reported.  Differential includes coronary artery disease, CHF, panic attack, other.  Plan:  Lexiscan nuclear stress test rule out CAD  Expedite transthoracic echocardiogram in view of shortness of breath  Continue aspirin and atorvastatin 20 mg p.o. daily  Start low-dose beta-blockers metoprolol tartrate 25 mg half tab twice daily for fast heart rate/CV prevention in the setting of angina

## 2024-08-15 NOTE — ASSESSMENT & PLAN NOTE
Unclear etiology of her shortness of breath at rest, limiting her conversation.  CTA chest negative for PE or severe lung disease.  No coronary calcifications mentioned on report.  Pulse ox 96% on room air in the office.  Lung exam normal.  proBNP normal troponin negative.  Differential includes severe CAD, CHF, panic attack, noncardiac etiology of pain such as referred abdominal pain, other.  Plan:  Expedited transthoracic echocardiogram  Stress test rule out CAD  Start low-dose beta-blockers for cardiovascular prevention in view of risk of severe CAD (carotid endarterectomy, chest pain, nonspecific EKG changes, negative workup for other etiologies)

## 2024-08-16 ENCOUNTER — TELEPHONE (OUTPATIENT)
Dept: CARDIOLOGY | Facility: CLINIC | Age: 79
End: 2024-08-16
Payer: MEDICARE

## 2024-08-16 ENCOUNTER — OFFICE VISIT (OUTPATIENT)
Dept: FAMILY MEDICINE CLINIC | Facility: CLINIC | Age: 79
End: 2024-08-16
Payer: MEDICARE

## 2024-08-16 VITALS
HEART RATE: 102 BPM | WEIGHT: 121 LBS | DIASTOLIC BLOOD PRESSURE: 84 MMHG | HEIGHT: 60 IN | SYSTOLIC BLOOD PRESSURE: 122 MMHG | BODY MASS INDEX: 23.75 KG/M2 | OXYGEN SATURATION: 96 %

## 2024-08-16 DIAGNOSIS — R06.00 DYSPNEA, UNSPECIFIED TYPE: Primary | ICD-10-CM

## 2024-08-16 PROCEDURE — 99213 OFFICE O/P EST LOW 20 MIN: CPT | Performed by: FAMILY MEDICINE

## 2024-08-16 NOTE — PROGRESS NOTES
Follow Up Office Visit      Patient Name: Susie Mancia  : 1945   MRN: [id number]     Chief Complaint:    Chief Complaint   Patient presents with    Shortness of Breath     Patient states this has not gotten any better since last visit     Fatigue     Patient here for follow up, had appointment with cardiology yesterday       History of Present Illness: Susie Mancia is a 79 y.o. female who is here today for follow up with ornis of breath.  Symptoms are about the same, may be slightly worse.  She was seen in the ER and had a negative CT per patient.  Patient saw cardiology yesterday and workup was initiated.    Subjective      Review of Systems:   Review of Systems   HENT:  Negative for congestion.    Respiratory:  Positive for shortness of breath. Negative for cough.    Cardiovascular:  Negative for palpitations and leg swelling.   All other systems reviewed and are negative.      The following portions of the patient's history were reviewed and updated as appropriate: allergies, current medications, past family history, past medical history, past social history, past surgical history and problem list.    Medications:     Current Outpatient Medications:     amoxicillin (AMOXIL) 500 MG capsule, Take 1 capsule by mouth 2 (Two) Times a Day., Disp: , Rfl:     aspirin 81 MG EC tablet, Take 1 tablet by mouth Daily., Disp: , Rfl:     atorvastatin (LIPITOR) 20 MG tablet, Take 1 tablet by mouth Every Night., Disp: , Rfl:     fluticasone (FLONASE) 50 MCG/ACT nasal spray, USE 1 SPRAY(S) IN EACH NOSTRIL TWICE DAILY, Disp: , Rfl:     levothyroxine (SYNTHROID, LEVOTHROID) 50 MCG tablet, TAKE 1 TABLET BY MOUTH ONCE DAILY BEFORE BREAKFAST, Disp: , Rfl:     PARoxetine (PAXIL) 20 MG tablet, Take 1 tablet by mouth once daily, Disp: 90 tablet, Rfl: 1    metoprolol tartrate (LOPRESSOR) 25 MG tablet, Take 0.5 tablets by mouth 2 (Two) Times a Day. (Patient not taking: Reported on 2024), Disp: 90 tablet, Rfl: 3     "predniSONE (DELTASONE) 20 MG tablet, , Disp: , Rfl:     Allergies:   Allergies   Allergen Reactions    Morphine Itching       Objective     Physical Exam:  Vital Signs:   Vitals:    08/16/24 1257   BP: 122/84   BP Location: Left arm   Patient Position: Sitting   Cuff Size: Adult   Pulse: 102   SpO2: 96%   Weight: 54.9 kg (121 lb)   Height: 152.4 cm (60\")     Body mass index is 23.63 kg/m².   Facility age limit for growth %shelby is 20 years.    Physical Exam  Vitals and nursing note reviewed.   Constitutional:       General: She is not in acute distress.     Appearance: Normal appearance. She is not ill-appearing.   Cardiovascular:      Rate and Rhythm: Regular rhythm. Tachycardia present.   Pulmonary:      Effort: Pulmonary effort is normal.      Breath sounds: Normal breath sounds.   Neurological:      Mental Status: She is alert.         Procedures    PHQ-9 Total Score: 0     Assessment / Plan      Assessment/Plan:   Assessment & Plan  Dyspnea, unspecified type  Etiology not known at this time.  Workup pending.  I recommend patient rest and limit exertional activities pending cardiac workup.  If cardiac workup is negative, consider pulmonology evaluation.               BMI is within normal parameters. No other follow-up for BMI required.      Follow Up:   No follow-ups on file.      JOSE DAVID Michelle MD  [facility]  "

## 2024-08-19 ENCOUNTER — TELEPHONE (OUTPATIENT)
Dept: CARDIOLOGY | Facility: CLINIC | Age: 79
End: 2024-08-19
Payer: MEDICARE

## 2024-08-21 ENCOUNTER — TELEPHONE (OUTPATIENT)
Dept: CARDIOLOGY | Facility: CLINIC | Age: 79
End: 2024-08-21
Payer: MEDICARE

## 2024-08-22 ENCOUNTER — TELEPHONE (OUTPATIENT)
Dept: CARDIOLOGY | Facility: CLINIC | Age: 79
End: 2024-08-22
Payer: MEDICARE

## 2024-08-22 NOTE — TELEPHONE ENCOUNTER
----- Message from Renaldo Alonzo sent at 8/21/2024  8:16 AM EDT -----  Regarding: Test results  Please inform patient that her transthoracic echocardiogram showed normal cardiac function and normal for age stiffening indices.  Thank you!  ----- Message -----  From: Lizette Langley  Sent: 8/16/2024   9:45 AM EDT  To: Renaldo Alonzo MD

## 2024-08-22 NOTE — TELEPHONE ENCOUNTER
PT CALLED CHECKING IN ON RESULTS OF TEST    READ OFF NOTES     PT WOULD STILL LIKE TO DISCUSS AND GO OVER     PLEASE ADVISE PT

## 2024-08-27 ENCOUNTER — APPOINTMENT (OUTPATIENT)
Dept: CT IMAGING | Facility: HOSPITAL | Age: 79
End: 2024-08-27
Payer: MEDICARE

## 2024-08-27 ENCOUNTER — OFFICE VISIT (OUTPATIENT)
Dept: FAMILY MEDICINE CLINIC | Facility: CLINIC | Age: 79
End: 2024-08-27
Payer: MEDICARE

## 2024-08-27 ENCOUNTER — APPOINTMENT (OUTPATIENT)
Dept: GENERAL RADIOLOGY | Facility: HOSPITAL | Age: 79
End: 2024-08-27
Payer: MEDICARE

## 2024-08-27 ENCOUNTER — HOSPITAL ENCOUNTER (EMERGENCY)
Facility: HOSPITAL | Age: 79
Discharge: HOME | End: 2024-08-27
Attending: EMERGENCY MEDICINE
Payer: MEDICARE

## 2024-08-27 VITALS
WEIGHT: 118 LBS | HEIGHT: 60 IN | SYSTOLIC BLOOD PRESSURE: 182 MMHG | OXYGEN SATURATION: 93 % | TEMPERATURE: 98.5 F | RESPIRATION RATE: 20 BRPM | BODY MASS INDEX: 23.16 KG/M2 | DIASTOLIC BLOOD PRESSURE: 79 MMHG | HEART RATE: 89 BPM

## 2024-08-27 VITALS
BODY MASS INDEX: 23.3 KG/M2 | HEIGHT: 60 IN | WEIGHT: 118.7 LBS | DIASTOLIC BLOOD PRESSURE: 74 MMHG | HEART RATE: 99 BPM | SYSTOLIC BLOOD PRESSURE: 120 MMHG | OXYGEN SATURATION: 98 %

## 2024-08-27 DIAGNOSIS — R06.02 SHORTNESS OF BREATH: Primary | ICD-10-CM

## 2024-08-27 DIAGNOSIS — E86.0 DEHYDRATION: ICD-10-CM

## 2024-08-27 DIAGNOSIS — R53.1 WEAKNESS: ICD-10-CM

## 2024-08-27 DIAGNOSIS — R53.1 GENERALIZED WEAKNESS: ICD-10-CM

## 2024-08-27 DIAGNOSIS — R06.00 DYSPNEA, UNSPECIFIED TYPE: Primary | ICD-10-CM

## 2024-08-27 LAB
ALBUMIN SERPL-MCNC: 3.8 G/DL (ref 3.5–5.2)
ALBUMIN/GLOB SERPL: 1 G/DL
ALP SERPL-CCNC: 110 U/L (ref 39–117)
ALT SERPL W P-5'-P-CCNC: 13 U/L (ref 1–33)
ANION GAP SERPL CALCULATED.3IONS-SCNC: 12 MMOL/L (ref 5–15)
AST SERPL-CCNC: 35 U/L (ref 1–32)
BASOPHILS # BLD AUTO: 0.02 10*3/MM3 (ref 0–0.2)
BASOPHILS NFR BLD AUTO: 0.4 % (ref 0–1.5)
BILIRUB SERPL-MCNC: 0.3 MG/DL (ref 0–1.2)
BILIRUB UR QL STRIP: NEGATIVE
BUN SERPL-MCNC: 11 MG/DL (ref 8–23)
BUN/CREAT SERPL: 13.3 (ref 7–25)
CALCIUM SPEC-SCNC: 9.6 MG/DL (ref 8.6–10.5)
CHLORIDE SERPL-SCNC: 101 MMOL/L (ref 98–107)
CLARITY UR: CLEAR
CO2 SERPL-SCNC: 25 MMOL/L (ref 22–29)
COLOR UR: YELLOW
CREAT SERPL-MCNC: 0.83 MG/DL (ref 0.57–1)
CRP SERPL-MCNC: 0.64 MG/DL (ref 0–0.5)
D-LACTATE SERPL-SCNC: 1.1 MMOL/L (ref 0.5–2)
DEPRECATED RDW RBC AUTO: 41.6 FL (ref 37–54)
EGFRCR SERPLBLD CKD-EPI 2021: 72.3 ML/MIN/1.73
EOSINOPHIL # BLD AUTO: 0.22 10*3/MM3 (ref 0–0.4)
EOSINOPHIL NFR BLD AUTO: 4 % (ref 0.3–6.2)
ERYTHROCYTE [DISTWIDTH] IN BLOOD BY AUTOMATED COUNT: 12.6 % (ref 12.3–15.4)
ERYTHROCYTE [SEDIMENTATION RATE] IN BLOOD: 125 MM/HR (ref 0–30)
GLOBULIN UR ELPH-MCNC: 3.9 GM/DL
GLUCOSE SERPL-MCNC: 102 MG/DL (ref 65–99)
GLUCOSE UR STRIP-MCNC: NEGATIVE MG/DL
HCT VFR BLD AUTO: 40.5 % (ref 34–46.6)
HGB BLD-MCNC: 13 G/DL (ref 12–15.9)
HGB UR QL STRIP.AUTO: NEGATIVE
HOLD SPECIMEN: NORMAL
IMM GRANULOCYTES # BLD AUTO: 0.03 10*3/MM3 (ref 0–0.05)
IMM GRANULOCYTES NFR BLD AUTO: 0.5 % (ref 0–0.5)
KETONES UR QL STRIP: NEGATIVE
LEUKOCYTE ESTERASE UR QL STRIP.AUTO: NEGATIVE
LYMPHOCYTES # BLD AUTO: 1.12 10*3/MM3 (ref 0.7–3.1)
LYMPHOCYTES NFR BLD AUTO: 20.5 % (ref 19.6–45.3)
MAGNESIUM SERPL-MCNC: 2.3 MG/DL (ref 1.6–2.4)
MCH RBC QN AUTO: 28.8 PG (ref 26.6–33)
MCHC RBC AUTO-ENTMCNC: 32.1 G/DL (ref 31.5–35.7)
MCV RBC AUTO: 89.8 FL (ref 79–97)
MONOCYTES # BLD AUTO: 0.49 10*3/MM3 (ref 0.1–0.9)
MONOCYTES NFR BLD AUTO: 9 % (ref 5–12)
NEUTROPHILS NFR BLD AUTO: 3.58 10*3/MM3 (ref 1.7–7)
NEUTROPHILS NFR BLD AUTO: 65.6 % (ref 42.7–76)
NITRITE UR QL STRIP: NEGATIVE
NRBC BLD AUTO-RTO: 0 /100 WBC (ref 0–0.2)
PH UR STRIP.AUTO: 6.5 [PH] (ref 5–8)
PLATELET # BLD AUTO: 326 10*3/MM3 (ref 140–450)
PMV BLD AUTO: 10 FL (ref 6–12)
POTASSIUM SERPL-SCNC: 4.6 MMOL/L (ref 3.5–5.2)
PROCALCITONIN SERPL-MCNC: 0.04 NG/ML (ref 0–0.25)
PROT SERPL-MCNC: 7.7 G/DL (ref 6–8.5)
PROT UR QL STRIP: NEGATIVE
RBC # BLD AUTO: 4.51 10*6/MM3 (ref 3.77–5.28)
SODIUM SERPL-SCNC: 138 MMOL/L (ref 136–145)
SP GR UR STRIP: 1.01 (ref 1–1.03)
T4 FREE SERPL-MCNC: 1.34 NG/DL (ref 0.92–1.68)
TROPONIN T SERPL HS-MCNC: <6 NG/L
TSH SERPL DL<=0.05 MIU/L-ACNC: 1.78 UIU/ML (ref 0.27–4.2)
UROBILINOGEN UR QL STRIP: NORMAL
WBC NRBC COR # BLD AUTO: 5.46 10*3/MM3 (ref 3.4–10.8)
WHOLE BLOOD HOLD COAG: NORMAL
WHOLE BLOOD HOLD SPECIMEN: NORMAL

## 2024-08-27 PROCEDURE — 71045 X-RAY EXAM CHEST 1 VIEW: CPT

## 2024-08-27 PROCEDURE — 74177 CT ABD & PELVIS W/CONTRAST: CPT

## 2024-08-27 PROCEDURE — 25810000003 SODIUM CHLORIDE 0.9 % SOLUTION: Performed by: EMERGENCY MEDICINE

## 2024-08-27 PROCEDURE — 83605 ASSAY OF LACTIC ACID: CPT | Performed by: EMERGENCY MEDICINE

## 2024-08-27 PROCEDURE — 84145 PROCALCITONIN (PCT): CPT | Performed by: EMERGENCY MEDICINE

## 2024-08-27 PROCEDURE — 85025 COMPLETE CBC W/AUTO DIFF WBC: CPT

## 2024-08-27 PROCEDURE — 36415 COLL VENOUS BLD VENIPUNCTURE: CPT

## 2024-08-27 PROCEDURE — 83735 ASSAY OF MAGNESIUM: CPT

## 2024-08-27 PROCEDURE — 93005 ELECTROCARDIOGRAM TRACING: CPT | Performed by: EMERGENCY MEDICINE

## 2024-08-27 PROCEDURE — 86140 C-REACTIVE PROTEIN: CPT | Performed by: EMERGENCY MEDICINE

## 2024-08-27 PROCEDURE — 84443 ASSAY THYROID STIM HORMONE: CPT | Performed by: EMERGENCY MEDICINE

## 2024-08-27 PROCEDURE — G2211 COMPLEX E/M VISIT ADD ON: HCPCS | Performed by: FAMILY MEDICINE

## 2024-08-27 PROCEDURE — 85652 RBC SED RATE AUTOMATED: CPT | Performed by: EMERGENCY MEDICINE

## 2024-08-27 PROCEDURE — 99285 EMERGENCY DEPT VISIT HI MDM: CPT

## 2024-08-27 PROCEDURE — 93005 ELECTROCARDIOGRAM TRACING: CPT

## 2024-08-27 PROCEDURE — 84439 ASSAY OF FREE THYROXINE: CPT | Performed by: EMERGENCY MEDICINE

## 2024-08-27 PROCEDURE — 25510000001 IOPAMIDOL 61 % SOLUTION: Performed by: EMERGENCY MEDICINE

## 2024-08-27 PROCEDURE — 99214 OFFICE O/P EST MOD 30 MIN: CPT | Performed by: FAMILY MEDICINE

## 2024-08-27 PROCEDURE — 84484 ASSAY OF TROPONIN QUANT: CPT

## 2024-08-27 PROCEDURE — 80053 COMPREHEN METABOLIC PANEL: CPT

## 2024-08-27 PROCEDURE — 81003 URINALYSIS AUTO W/O SCOPE: CPT

## 2024-08-27 RX ORDER — SODIUM CHLORIDE 0.9 % (FLUSH) 0.9 %
10 SYRINGE (ML) INJECTION AS NEEDED
Status: DISCONTINUED | OUTPATIENT
Start: 2024-08-27 | End: 2024-08-27 | Stop reason: HOSPADM

## 2024-08-27 RX ORDER — IOPAMIDOL 612 MG/ML
75 INJECTION, SOLUTION INTRAVASCULAR
Status: COMPLETED | OUTPATIENT
Start: 2024-08-27 | End: 2024-08-27

## 2024-08-27 RX ADMIN — SODIUM CHLORIDE 1000 ML: 9 INJECTION, SOLUTION INTRAVENOUS at 17:53

## 2024-08-27 RX ADMIN — IOPAMIDOL 75 ML: 612 INJECTION, SOLUTION INTRAVENOUS at 18:50

## 2024-08-27 NOTE — ED PROVIDER NOTES
Subjective   History of Present Illness    Pt presents with several week history of fatigue, weakness and dyspnea.  All symptoms worse with exertion.  She has had a little unintentional weight loss as well.  She has been seen in the office for these symptoms as well as at the [place] [facility].  She has had a stress test, echo, CTA of chest, some labs and all workup has been unyielding.  She continues to feel quite poorly.  She saw PCP again today and was directed to the ED for possible admission.    She denies fever, vomiting, diarrhea, chest pain.  No recent medication changes.  Has hypothyroidism, no dose change.      History provided by:  Patient      Review of Systems    Past Medical History:   Diagnosis Date    Hypercholesteremia     Pancreatitis     Thyroiditis        Allergies   Allergen Reactions    Metoprolol Tartrate [Metoprolol] Hives    Morphine Itching       Past Surgical History:   Procedure Laterality Date    BLADDER REPAIR      CAROTID ENDARTERECTOMY Right     HYSTERECTOMY      NASAL SINUS SURGERY         Family History   Problem Relation Age of Onset    Hypertension Father     Hypertension Sister     Hypertension Brother        Social History     Socioeconomic History    Marital status:    Tobacco Use    Smoking status: Former     Current packs/day: 0.00     Average packs/day: 0.3 packs/day for 3.0 years (0.8 ttl pk-yrs)     Types: Cigarettes     Start date: 1963     Quit date: 1966     Years since quittin.6    Smokeless tobacco: Never    Tobacco comments:     2-3 per day ( not everyday)   Vaping Use    Vaping status: Never Used   Substance and Sexual Activity    Alcohol use: Never    Drug use: Never    Sexual activity: Not Currently     Partners: Male     Birth control/protection: Hysterectomy           Objective   Physical Exam  Vitals and nursing note reviewed.   Constitutional:       General: She is not in acute distress.     Appearance: Normal appearance. She is not  ill-appearing.   HENT:      Head: Normocephalic and atraumatic.   Eyes:      General: No scleral icterus.        Right eye: No discharge.         Left eye: No discharge.      Conjunctiva/sclera: Conjunctivae normal.   Cardiovascular:      Rate and Rhythm: Normal rate.   Pulmonary:      Effort: Pulmonary effort is normal. No respiratory distress.   Musculoskeletal:         General: No swelling or deformity.      Cervical back: Normal range of motion and neck supple.   Skin:     General: Skin is dry.      Findings: No rash.   Neurological:      General: No focal deficit present.      Mental Status: She is alert and oriented to person, place, and time. Mental status is at baseline.   Psychiatric:         Mood and Affect: Mood normal.         Behavior: Behavior normal.         Thought Content: Thought content normal.         Procedures           ED Course    I reviewed prior records.  PCP note dated today notes recent ED workup negative for PE.  Stress test SPECT 8/21/24 negative/normal.  EF hyperdynamic >70%.  CXR 8/8/24 reviewed, no acute process.  Cardiology note 8/15/24 also reports a negative CTA of chest at ED visit.      EKG NSR read by me.    CXR NAD read by me.  CT abd/pel negative for tumors or masses, read by me.  All radiologist reports reviewed.    Labs are all benign.  Vitals are normal.  Ambulatory pulse ox 96%.    Differential includes undiagnosed CAD though the specific type of stress test she had is fairly accurate.  She has no cardiomegaly, no EKG changes.  On labs she is euthyroid and similar to previous values.  No anemia.  Infectious markers are negative.  Urine SG is 1.010, bicarb is 25 and BUN/Cr are normal with normal ratio so significant dehydration does not appear to be present.  ESR/CRP are up, of course nonspecific.  I would also consider an autoimmune disease in this setting.      Long discussion with patient and family at the bedside.  She is clearly unhappy with her symptoms and quite  reasonably so.  We discussed various possible causes.  With normal vitals, labs, imaging and the recent outpatient workup she has had it is unclear what specific benefit would be obtained by hospitalization.  I told her that if she feels she is not well enough to go home then we would obs her and see what they can come up with.                                         Medical Decision Making  Problems Addressed:  Generalized weakness: complicated acute illness or injury with systemic symptoms  Shortness of breath: complicated acute illness or injury with systemic symptoms    Amount and/or Complexity of Data Reviewed  Independent Historian: caregiver  External Data Reviewed: radiology and notes.  Labs: ordered. Decision-making details documented in ED Course.  Radiology: ordered and independent interpretation performed. Decision-making details documented in ED Course.  ECG/medicine tests: ordered and independent interpretation performed. Decision-making details documented in ED Course.    Risk  Prescription drug management.  Decision regarding hospitalization.        Final diagnoses:   Shortness of breath   Generalized weakness       ED Disposition  ED Disposition       ED Disposition   Discharge    Condition   Stable    Comment   --               [facility] [place]  [address]  [place] [id number]  [phone]  Call in 1 day           Medication List      No changes were made to your prescriptions during this visit.            Erick Mcgee MD  08/27/24 9167

## 2024-08-27 NOTE — PROGRESS NOTES
Follow Up Office Visit      Patient Name: Susie Mancia  : 1945   MRN: [id number]     Chief Complaint:    Chief Complaint   Patient presents with    Shortness of Breath     Follow up, patient states SOB has not improved. Did have stress test and was told everything looked good.    Dehydration     Concerned she may be dehydrated and needs fluids, also complains or nausea        History of Present Illness: Susie Mancia is a 79 y.o. female who is here today for follow up with shortness of breath.  Patient states symptoms have worsened.  She just had cardiac workup by cardiology including stress test and echo and were negative.  She complains of profound weakness and states she is dehydrated.  She also had recent ER evaluation at [facility] which was negative for PE.    Subjective      Review of Systems:   Review of Systems   Respiratory:  Positive for shortness of breath.    Neurological:  Positive for weakness.       The following portions of the patient's history were reviewed and updated as appropriate: allergies, current medications, past family history, past medical history, past social history, past surgical history and problem list.    Medications:     Current Outpatient Medications:     aspirin 81 MG EC tablet, Take 1 tablet by mouth Daily., Disp: , Rfl:     atorvastatin (LIPITOR) 20 MG tablet, Take 1 tablet by mouth Every Night., Disp: , Rfl:     fluticasone (FLONASE) 50 MCG/ACT nasal spray, USE 1 SPRAY(S) IN EACH NOSTRIL TWICE DAILY, Disp: , Rfl:     levothyroxine (SYNTHROID, LEVOTHROID) 50 MCG tablet, TAKE 1 TABLET BY MOUTH ONCE DAILY BEFORE BREAKFAST, Disp: , Rfl:     PARoxetine (PAXIL) 20 MG tablet, Take 1 tablet by mouth once daily, Disp: 90 tablet, Rfl: 1    Current Facility-Administered Medications:     ondansetron ODT (ZOFRAN-ODT) disintegrating tablet 4 mg, 4 mg, Translingual, Once, Renaldo Alonzo MD    Allergies:   Allergies   Allergen Reactions    Metoprolol Tartrate  "[Metoprolol] Hives    Morphine Itching       Objective     Physical Exam:  Vital Signs:   Vitals:    08/27/24 1102   BP: 120/74   BP Location: Left arm   Patient Position: Sitting   Cuff Size: Adult   Pulse: 99   SpO2: 98%   Weight: 53.8 kg (118 lb 11.2 oz)   Height: 152.4 cm (60\")     Body mass index is 23.18 kg/m².   Facility age limit for growth %shelby is 20 years.    Physical Exam  Vitals and nursing note reviewed.   Constitutional:       Appearance: Normal appearance. She is ill-appearing.   Cardiovascular:      Rate and Rhythm: Normal rate and regular rhythm.   Pulmonary:      Effort: Pulmonary effort is normal.      Breath sounds: Normal breath sounds.   Skin:     Coloration: Skin is pale.      Comments: Diffuse tenting of the skin noted of the bilateral upper extremities.   Neurological:      Mental Status: She is alert.         Procedures    PHQ-9 Total Score:       Assessment / Plan      Assessment/Plan:   Assessment & Plan  Dyspnea, unspecified type  Etiology not clear at this time.  Prognosis indeterminate.  Pulmonology referral placed.  Symptoms are significantly worse since last visit.  Cardiology workup so far has been negative.  Patient is concerned because her father and brother both had false negative stress test, which can happen on occasion with diffuse disease.  Will refer to [facility] in [place].  I called report to provider on staff today, explaining recent history and current symptoms.  Weakness  Moderate to severe weakness noted.  Patient had difficulty ambulating from the room back to the waiting room.  As above.  Dehydration      Orders Placed This Encounter   Procedures    Ambulatory Referral to Pulmonology               BMI is within normal parameters. No other follow-up for BMI required.      Follow Up:   No follow-ups on file.      JOSE DAVID Michelle MD  [facility]  "

## 2024-08-28 DIAGNOSIS — R70.0 ELEVATED SED RATE: Primary | ICD-10-CM

## 2024-08-28 DIAGNOSIS — R06.00 DYSPNEA, UNSPECIFIED TYPE: ICD-10-CM

## 2024-08-28 LAB
QT INTERVAL: 366 MS
QTC INTERVAL: 427 MS

## 2024-08-28 RX ORDER — PREDNISONE 20 MG/1
20 TABLET ORAL
Qty: 10 TABLET | Refills: 0 | Status: SHIPPED | OUTPATIENT
Start: 2024-08-28 | End: 2024-08-28

## 2024-08-30 ENCOUNTER — PATIENT ROUNDING (BHMG ONLY) (OUTPATIENT)
Dept: PULMONOLOGY | Facility: CLINIC | Age: 79
End: 2024-08-30
Payer: MEDICARE

## 2024-08-30 ENCOUNTER — OFFICE VISIT (OUTPATIENT)
Dept: PULMONOLOGY | Facility: CLINIC | Age: 79
End: 2024-08-30
Payer: MEDICARE

## 2024-08-30 VITALS
WEIGHT: 119 LBS | DIASTOLIC BLOOD PRESSURE: 78 MMHG | OXYGEN SATURATION: 97 % | BODY MASS INDEX: 23.36 KG/M2 | HEIGHT: 60 IN | TEMPERATURE: 97.8 F | HEART RATE: 76 BPM | SYSTOLIC BLOOD PRESSURE: 126 MMHG

## 2024-08-30 DIAGNOSIS — J47.9 BRONCHIECTASIS WITHOUT ACUTE EXACERBATION: Primary | ICD-10-CM

## 2024-08-30 DIAGNOSIS — R05.3 CHRONIC COUGH: ICD-10-CM

## 2024-08-30 RX ORDER — AZITHROMYCIN 250 MG/1
250 TABLET, FILM COATED ORAL 3 TIMES WEEKLY
Qty: 36 TABLET | Refills: 3 | Status: SHIPPED | OUTPATIENT
Start: 2024-08-30

## 2024-08-30 NOTE — PROGRESS NOTES
August 30, 2024    Hello, may I speak with Susie Mancia?    My name is SANDRA ROGER      I am  with [facility]  [address] [address] [phone].    Before we get started may I verify your date of birth? 1945    I am calling to officially welcome you to our practice and ask about your recent visit. Is this a good time to talk? yes    Tell me about your visit with us. What things went well?  PT SAW CANDIDA DIXON ON THIS DATE. PT WAS VERY PLEASED WITH THE DR SHE SAID SHE HAD BEEN WAITING FOR ANSWERS AND HE BROUGHT HER SOME WHICH BROUGHT HER COMFORT. SHE WAS PLEASED WITH THE STAFF. PT SAID MINDY CHICAS WAS ABSOLUTELY RIGHT, SHE DID LOVE THE FACILITY AND HER PROVIDER.        We're always looking for ways to make our patients' experiences even better. Do you have recommendations on ways we may improve?  no    Overall were you satisfied with your first visit to our practice? yes       I appreciate you taking the time to speak with me today. Is there anything else I can do for you? no      Thank you, and have a great day.

## 2024-08-30 NOTE — PROGRESS NOTES
Subjective:     Chief Complaint:   Chief Complaint   Patient presents with    Cough       HPI:    Susie Mancia is a 79 y.o. female seen in consultation at the request of Isidro Michelle MD    She is a previous patient of Dr. Fall in [place] and saw him prior to his USP last year.    It sounds as if she had a pleural effusion in the past and thoracentesis but this was remote and it does not sound as if any specific diagnosis was made.    His primary diagnosis for her recently has been chronic bronchitis.  She had frequent episodes of bronchitis and pneumonia.  He placed her on thrice weekly azithromycin and this helped her significantly though she has not had a prescription since his USP.    She has never been given any other diagnoses such as bronchiectasis to her recollection.    She does have a cough that is more frequent than what she would consider to be average but it is not severe.  It is nonproductive in general.  She does have sinus infections frequently.  She has history of prior sinus surgery.  She also had a recent episode of BPV.    She has history of chronic pancreatitis and is on a modified diet.  She has acid reflux but this is well-controlled again with her diet.    Over the last year and in general she thinks she averages 1 course of antibiotics a year.    She has no chronic dyspnea on exertion.  Her exercise tolerance is not limited.  She remains very active.  She is on no inhaled therapy.    Current medications are:   Current Outpatient Medications:     aspirin 81 MG EC tablet, Take 1 tablet by mouth Daily., Disp: , Rfl:     atorvastatin (LIPITOR) 20 MG tablet, Take 1 tablet by mouth Every Night., Disp: , Rfl:     fluticasone (FLONASE) 50 MCG/ACT nasal spray, USE 1 SPRAY(S) IN EACH NOSTRIL TWICE DAILY, Disp: , Rfl:     levothyroxine (SYNTHROID, LEVOTHROID) 50 MCG tablet, TAKE 1 TABLET BY MOUTH ONCE DAILY BEFORE BREAKFAST, Disp: , Rfl:     PARoxetine (PAXIL) 20 MG tablet, Take  1 tablet by mouth once daily, Disp: 90 tablet, Rfl: 1    azithromycin (Zithromax) 250 MG tablet, Take 1 tablet by mouth 3 (Three) Times a Week., Disp: 36 tablet, Rfl: 3    Current Facility-Administered Medications:     ondansetron ODT (ZOFRAN-ODT) disintegrating tablet 4 mg, 4 mg, Translingual, Once, Renaldo Alonzo MD.      The patient's relevant past medical, surgical, family and social history were reviewed and updated in Epic as appropriate.     ROS:    Review of Systems  ROS documented in patient questionnaire ×14 systems.  Reviewed with patient.  Otherwise negative except as noted in HPI.    Objective:    Physical Exam  Vitals and nursing note reviewed.   Constitutional:       Appearance: Normal appearance. She is well-developed.   HENT:      Head: Normocephalic and atraumatic.      Nose: Nose normal.      Mouth/Throat:      Mouth: Mucous membranes are moist.      Pharynx: Oropharynx is clear. No oropharyngeal exudate.   Eyes:      General: No scleral icterus.     Conjunctiva/sclera: Conjunctivae normal.   Neck:      Thyroid: No thyromegaly.      Trachea: No tracheal deviation.   Cardiovascular:      Rate and Rhythm: Normal rate and regular rhythm.      Heart sounds: No murmur heard.     No friction rub. No gallop.   Pulmonary:      Effort: Pulmonary effort is normal. No respiratory distress.      Breath sounds: No wheezing or rales.   Musculoskeletal:         General: No deformity. Normal range of motion.   Skin:     General: Skin is warm and dry.      Findings: No rash.   Neurological:      Mental Status: She is alert and oriented to person, place, and time.   Psychiatric:         Behavior: Behavior normal.         Thought Content: Thought content normal.         Data:     PFT: Normal spirometry.  Normal lung volumes.  Reduced diffusing capacity 60%.    CXR:  Chest x-ray dated 8/8/2024 reviewed.  There was no evidence of significant lung disease.    Reviewed extensive paper charting from   [facility]    Assessment:    Problem List Items Addressed This Visit          Pulmonary Problems    Bronchiectasis without acute exacerbation - Primary    Relevant Orders    CT Chest With Contrast Diagnostic    Spirometry with Diffusion Capacity & Lung Volumes (Completed)    Chronic cough    Relevant Orders    CT Chest With Contrast Diagnostic       79-year-old female seen in consultation.  She was previously followed by Dr. Fall in [place] and carried a primary pulmonary diagnosis of chronic bronchitis.  She has had frequent lung infections in the past and he had her on azithromycin thrice weekly which helped her significantly and she has been doing worse since that prescription ran out after his MCC.    She was never given a diagnosis of bronchiectasis but I reviewed Dr. Fall's paper charts and there were some images screenshot it from her cardiac CT scan which revealed clear bronchiectasis in the lower lobes.    She does not seem to have any systemic disease that would predispose her to bronchiectasis and I suspect that this is probably idiopathic.  She should have a dedicated chest CT to further characterize this.    Plan:     Chest CT with contrast  Alpha-1 antitrypsin level  I will reorder her azithromycin 250 mg thrice weekly which was the dose she was on with Dr. Fall and was effective  3-month follow-up to assess her response to azithromycin and I will review chest CT images when they are completed    Level of Risk Moderate due to: prescription drug management    Signed by  Sheldon Yeun MD

## 2024-09-03 ENCOUNTER — TELEPHONE (OUTPATIENT)
Dept: FAMILY MEDICINE CLINIC | Facility: CLINIC | Age: 79
End: 2024-09-03

## 2024-09-03 NOTE — TELEPHONE ENCOUNTER
Caller: Susie Mancia    Relationship: Self    Best call back number: [phone]     What is the best time to reach you: ANYTIME     Who are you requesting to speak with (clinical staff, provider,  specific staff member): CLINICAL STAFF    What was the call regarding: PATIENT IS CALLING TO LET THE PROVIDER KNOW HER DIAGNOSIS FROM THE PULMONOLOGIST. PATIENT SAYS THAT SHE HAS BEEN DIAGNOSED WITH BRONCHIECTASIS AND SHE HAS BEEN PRESCRIBED AN ANTIBIOTIC.     Is it okay if the provider responds through MyChart: YES

## 2024-10-01 ENCOUNTER — HOSPITAL ENCOUNTER (OUTPATIENT)
Dept: CT IMAGING | Facility: HOSPITAL | Age: 79
Discharge: HOME | End: 2024-10-01
Admitting: INTERNAL MEDICINE
Payer: MEDICARE

## 2024-10-01 DIAGNOSIS — R05.3 CHRONIC COUGH: ICD-10-CM

## 2024-10-01 DIAGNOSIS — J47.9 BRONCHIECTASIS WITHOUT ACUTE EXACERBATION: ICD-10-CM

## 2024-10-01 PROCEDURE — 71260 CT THORAX DX C+: CPT

## 2024-10-01 PROCEDURE — 25510000001 IOPAMIDOL 61 % SOLUTION: Performed by: INTERNAL MEDICINE

## 2024-10-01 RX ORDER — IOPAMIDOL 612 MG/ML
100 INJECTION, SOLUTION INTRAVASCULAR
Status: COMPLETED | OUTPATIENT
Start: 2024-10-01 | End: 2024-10-01

## 2024-10-01 RX ADMIN — IOPAMIDOL 85 ML: 612 INJECTION, SOLUTION INTRAVENOUS at 15:40

## 2024-11-22 ENCOUNTER — OFFICE VISIT (OUTPATIENT)
Dept: PULMONOLOGY | Facility: CLINIC | Age: 79
End: 2024-11-22
Payer: MEDICARE

## 2024-11-22 VITALS
SYSTOLIC BLOOD PRESSURE: 144 MMHG | DIASTOLIC BLOOD PRESSURE: 78 MMHG | OXYGEN SATURATION: 98 % | HEART RATE: 79 BPM | BODY MASS INDEX: 24.54 KG/M2 | WEIGHT: 125 LBS | HEIGHT: 60 IN | TEMPERATURE: 98.3 F

## 2024-11-22 DIAGNOSIS — J47.9 BRONCHIECTASIS WITHOUT ACUTE EXACERBATION: ICD-10-CM

## 2024-11-22 DIAGNOSIS — R05.3 CHRONIC COUGH: ICD-10-CM

## 2024-11-22 DIAGNOSIS — Z23 IMMUNIZATION DUE: Primary | ICD-10-CM

## 2024-11-22 NOTE — LETTER
[facility]  Vaccine Consent Form    Patient Name:  Susie Mancia  Patient :  1945        Screening Checklist  The following questions should be completed prior to vaccination. If you answer “yes” to any question, it does not necessarily mean you should not be vaccinated. It just means we may need to clarify or ask more questions. If a question is unclear, please ask your healthcare provider to explain it.    Yes No   Any fever or moderate to severe illness today (mild illness and/or antibiotic treatment are not contraindications)?     Do you have a history of a serious reaction to any previous vaccinations, such as anaphylaxis, encephalopathy within 7 days, Guillain-Cheyenne syndrome within 6 weeks, seizure?     Have you received any live vaccine(s) (e.g MMR, MICHELLE) or any other vaccines in the last month (to ensure duplicate doses aren't given)?     Do you have an anaphylactic allergy to latex (DTaP, DTaP-IPV, Hep A, Hep B, MenB, RV, Td, Tdap), baker’s yeast (Hep B, HPV), polysorbates (RSV, nirsevimab, PCV 20, Rotavirrus, Tdap, Shingrix), or gelatin (MICHELLE, MMR)?     Do you have an anaphylactic allergy to neomycin (Rabies, MICHELLE, MMR, IPV, Hep A), polymyxin B (IPV), or streptomycin (IPV)?      Any cancer, leukemia, AIDS, or other immune system disorder? (MICHELLE, MMR, RV)     Do you have a parent, brother, or sister with an immune system problem (if immune competence of vaccine recipient clinically verified, can proceed)? (MMR, MICHELLE)     Any recent steroid treatments for >2 weeks, chemotherapy, or radiation treatment? (MICHELLE, MMR)     Have you received antibody-containing blood transfusions or IVIG in the past 11 months (recommended interval is dependent on product)? (MMR, MICHELLE)     Have you taken antiviral drugs (acyclovir, famciclovir, valacyclovir for MICHELLE) in the last 24 or 48 hours, respectively?      Are you pregnant or planning to become pregnant within 1 month? (MICHELLE, MMR, HPV, IPV, MenB, Abrexvy; For Hep B-  "refer to Engerix-B; For RSV - Abrysvo is indicated for 32-36 weeks of pregnancy from September to January)     For infants, have you ever been told your child has had intussusception or a medical emergency involving obstruction of the intestine (Rotavirus)? If not for an infant, can skip this question.         *Ordering Physicians/APC should be consulted if \"yes\" is checked by the patient or guardian above.  I have received, read, and understand the Vaccine Information Statement (VIS) for each vaccine ordered.  I have considered my or my child's health status as well as the health status of my close contacts.  I have taken the opportunity to discuss my vaccine questions with my or my child's health care provider.   I have requested that the ordered vaccine(s) be given to me or my child.  I understand the benefits and risks of the vaccines.  I understand that I should remain in the clinic for 15 minutes after receiving the vaccine(s).  _________________________________________________________  Signature of Patient or Parent/Legal Guardian ____________________  Date     "

## 2024-11-22 NOTE — PROGRESS NOTES
Subjective:     Chief Complaint:   Chief Complaint   Patient presents with    Bronchiectasis without acute exacerbation    POST CT       HPI:    Susie Mancia is a 79 y.o. female here for follow-up of bronchiectasis    She is a previous patient of Dr. Fall in [place] prior to his shelter in 2023.    It sounds as if she had a pleural effusion in the past and thoracentesis but this was remote and it does not sound as if any specific diagnosis was made.    She had frequent episodes of bronchitis and pneumonia.  He placed her on thrice weekly azithromycin.  Her diagnosis in his chart was chronic bronchitis.  She does not ever recall being given a diagnosis of bronchiectasis.    She does have a cough that is more frequent than what she would consider to be average but it is not severe.  It is nonproductive in general.  She does have sinus infections frequently.  She has history of prior sinus surgery.      She has history of chronic pancreatitis and is on a modified diet.  She has acid reflux but this is well-controlled again with her diet.    Over the last year and in general she thinks she averages 1 course of antibiotics a year.    She has no chronic dyspnea on exertion.  Her exercise tolerance is not limited.  She remains very active.  She is on no inhaled therapy.    I saw her initially in August and she is here for follow-up today.  She is feeling much better after we resumed her azithromycin 250 mg thrice weekly.  She had been taking that from Dr. Fall for about 4 years.    A chest CT revealed low-grade generalized cylindrical bronchiectasis there were no other findings of chronic lung disease.  An incidental tracheal diverticulum was noted.    Current medications are:   Current Outpatient Medications:     aspirin 81 MG EC tablet, Take 1 tablet by mouth Daily., Disp: , Rfl:     atorvastatin (LIPITOR) 20 MG tablet, Take 1 tablet by mouth Every Night., Disp: , Rfl:     azithromycin (Zithromax) 250 MG  tablet, Take 1 tablet by mouth 3 (Three) Times a Week., Disp: 36 tablet, Rfl: 3    fluticasone (FLONASE) 50 MCG/ACT nasal spray, USE 1 SPRAY(S) IN EACH NOSTRIL TWICE DAILY, Disp: , Rfl:     levothyroxine (SYNTHROID, LEVOTHROID) 50 MCG tablet, TAKE 1 TABLET BY MOUTH ONCE DAILY BEFORE BREAKFAST, Disp: , Rfl:     PARoxetine (PAXIL) 20 MG tablet, Take 1 tablet by mouth once daily, Disp: 90 tablet, Rfl: 1    Current Facility-Administered Medications:     ondansetron ODT (ZOFRAN-ODT) disintegrating tablet 4 mg, 4 mg, Translingual, Once, Renaldo Alonzo MD.      The patient's relevant past medical, surgical, family and social history were reviewed and updated in Epic as appropriate.     ROS:    Review of Systems  ROS as documented in patient questionnaire unless as noted otherwise    Objective:    Physical Exam  Vitals reviewed.   Constitutional:       Appearance: She is well-developed.   HENT:      Head: Normocephalic and atraumatic.      Mouth/Throat:      Mouth: Mucous membranes are moist.      Pharynx: Oropharynx is clear.   Neck:      Thyroid: No thyromegaly.   Cardiovascular:      Rate and Rhythm: Normal rate and regular rhythm.      Heart sounds: No murmur heard.     No friction rub. No gallop.   Pulmonary:      Effort: Pulmonary effort is normal. No respiratory distress.      Breath sounds: No wheezing or rales.   Musculoskeletal:      Cervical back: Neck supple.   Skin:     General: Skin is warm and dry.   Neurological:      Mental Status: She is alert and oriented to person, place, and time.   Psychiatric:         Behavior: Behavior normal.         Thought Content: Thought content normal.         Data:    CT images reviewed and agree with report.    PFT: August 2024: Normal spirometry.  Normal lung volumes.  Decreased diffusion.    Assessment:    Problem List Items Addressed This Visit          Pulmonary Problems    Bronchiectasis without acute exacerbation    Chronic cough     Other Visit Diagnoses        Immunization due    -  Primary    Relevant Orders    Pneumococcal Conjugate Vaccine 20-Valent (PCV20) (Completed)              Bronchiectasis: Diffuse and cylindrical.  Low-grade.  No chronic interstitial lung disease.  No chronic sputum production.  Chronic cough: Relatively mild and low-grade/intermittent    Plan:    Historically she does quite well on azithromycin 250 mg thrice weekly and during the brief period of time she was off of it she has did worse.  Will continue this.  Just told her to be careful of any new medications prescribed to be sure there are no interactions  No further diagnostic workup for bronchiectasis etiologies would seem appropriate as she does not meet criteria for any systemic disease or immune deficiency.  Her alpha 1 antitrypsin screen is normal.  She has no evidence of chronic lung disease or central bronchiectasis/mucous plugging.  Routine follow-up at least yearly    Level of Risk Moderate due to: prescription drug management    Discussed in detail with the patient.  She will call prior to her follow up visit for any new problems.    Signed by  Sheldon Yuen MD

## 2025-01-08 DIAGNOSIS — F41.9 ANXIETY: ICD-10-CM

## 2025-01-08 RX ORDER — PAROXETINE 20 MG/1
20 TABLET, FILM COATED ORAL DAILY
Qty: 90 TABLET | Refills: 1 | Status: SHIPPED | OUTPATIENT
Start: 2025-01-08

## 2025-01-08 NOTE — TELEPHONE ENCOUNTER
Caller: Susie Mancia    Relationship: Self    Best call back number:       [phone] (Mobile)     Requested Prescriptions:   Requested Prescriptions     Pending Prescriptions Disp Refills    PARoxetine (PAXIL) 20 MG tablet 90 tablet 1     Sig: Take 1 tablet by mouth Daily.      Pharmacy where request should be sent:     [facility] [address] [place] [address] [facility] - [phone] [facility] [phone] FX     Last office visit with prescribing clinician: 8/27/2024   Last telemedicine visit with prescribing clinician: Visit date not found   Next office visit with prescribing clinician: 1/17/2025     Additional details provided by patient:     PATIENT STATED SHE HAS (5) DAY SUPPLY LEFT OF MEDICATION AFTER TODAY, 1/8    Does the patient have less than a 3 day supply:  [] Yes  [x] No    Would you like a call back once the refill request has been completed: [] Yes [] No    If the office needs to give you a call back, can they leave a voicemail: [] Yes [] No    Christian Vieira Rep   01/08/25 13:39 EST      Confirmed COVID-19

## 2025-01-17 ENCOUNTER — OFFICE VISIT (OUTPATIENT)
Dept: FAMILY MEDICINE CLINIC | Facility: CLINIC | Age: 80
End: 2025-01-17
Payer: MEDICARE

## 2025-01-17 VITALS
HEIGHT: 60 IN | WEIGHT: 131 LBS | HEART RATE: 85 BPM | OXYGEN SATURATION: 95 % | SYSTOLIC BLOOD PRESSURE: 112 MMHG | DIASTOLIC BLOOD PRESSURE: 68 MMHG | BODY MASS INDEX: 25.72 KG/M2

## 2025-01-17 DIAGNOSIS — R05.2 SUBACUTE COUGH: ICD-10-CM

## 2025-01-17 DIAGNOSIS — R73.03 PREDIABETES: ICD-10-CM

## 2025-01-17 DIAGNOSIS — E78.2 MIXED HYPERLIPIDEMIA: ICD-10-CM

## 2025-01-17 DIAGNOSIS — E03.9 ACQUIRED HYPOTHYROIDISM: ICD-10-CM

## 2025-01-17 DIAGNOSIS — Z12.31 BREAST CANCER SCREENING BY MAMMOGRAM: Primary | ICD-10-CM

## 2025-01-17 DIAGNOSIS — F41.9 ANXIETY: ICD-10-CM

## 2025-01-17 DIAGNOSIS — Z78.0 MENOPAUSE: ICD-10-CM

## 2025-01-17 DIAGNOSIS — J47.9 BRONCHIECTASIS WITHOUT ACUTE EXACERBATION: ICD-10-CM

## 2025-01-17 RX ORDER — PAROXETINE 20 MG/1
20 TABLET, FILM COATED ORAL DAILY
Qty: 90 TABLET | Refills: 3 | Status: SHIPPED | OUTPATIENT
Start: 2025-01-17

## 2025-01-17 NOTE — ASSESSMENT & PLAN NOTE
Traditionally has been managed by endocrinology.  I recommend patient bring a copy of labs for our records.

## 2025-01-17 NOTE — PROGRESS NOTES
Follow Up Office Visit      Patient Name: Susie Mancia  : 1945   MRN: [id number]     Chief Complaint:    Chief Complaint   Patient presents with    Med Refill     Refill on paroxetine- 90 day supply        History of Present Illness: Susie Mancia is a 79 y.o. female who is here today for follow up with anxiety.  Patient is needing a refill of paroxetine.  She is also on Lipitor and levothyroxine which is managed by endocrinology.  She has an appointment pending with endocrinology and will be having labs at that time.  Patient needs order for DEXA scan and mammogram.  She has had a mild productive cough with clear sputum for the past several weeks.  She states she does not feel bad with this cough.    Subjective      Review of Systems:   Review of Systems   Constitutional:  Negative for chills, diaphoresis, fatigue and fever.   HENT:  Negative for congestion, sore throat and swollen glands.    Respiratory:  Positive for cough.    Cardiovascular:  Negative for chest pain.   Gastrointestinal:  Negative for abdominal pain, nausea and vomiting.   Genitourinary:  Negative for dysuria.   Musculoskeletal:  Negative for myalgias and neck pain.   Skin:  Negative for rash.   Neurological:  Negative for weakness and numbness.       The following portions of the patient's history were reviewed and updated as appropriate: allergies, current medications, past family history, past medical history, past social history, past surgical history and problem list.    Medications:     Current Outpatient Medications:     aspirin 81 MG EC tablet, Take 1 tablet by mouth Daily., Disp: , Rfl:     atorvastatin (LIPITOR) 20 MG tablet, Take 1 tablet by mouth Every Night., Disp: , Rfl:     azithromycin (Zithromax) 250 MG tablet, Take 1 tablet by mouth 3 (Three) Times a Week., Disp: 36 tablet, Rfl: 3    fluticasone (FLONASE) 50 MCG/ACT nasal spray, USE 1 SPRAY(S) IN EACH NOSTRIL TWICE DAILY, Disp: , Rfl:     levothyroxine  "(SYNTHROID, LEVOTHROID) 50 MCG tablet, TAKE 1 TABLET BY MOUTH ONCE DAILY BEFORE BREAKFAST, Disp: , Rfl:     PARoxetine (PAXIL) 20 MG tablet, Take 1 tablet by mouth Daily., Disp: 90 tablet, Rfl: 3    Current Facility-Administered Medications:     ondansetron ODT (ZOFRAN-ODT) disintegrating tablet 4 mg, 4 mg, Translingual, Once, Renaldo Alonzo MD    Allergies:   Allergies   Allergen Reactions    Metoprolol Tartrate [Metoprolol] Hives    Morphine Itching       Objective     Physical Exam:  Vital Signs:   Vitals:    01/17/25 1447   BP: 112/68   BP Location: Right arm   Patient Position: Sitting   Cuff Size: Adult   Pulse: 85   SpO2: 95%   Weight: 59.4 kg (131 lb)   Height: 152.4 cm (60\")     Body mass index is 25.58 kg/m².   Facility age limit for growth %shelby is 20 years.    Physical Exam  Vitals and nursing note reviewed.   Constitutional:       General: She is not in acute distress.     Appearance: Normal appearance. She is not ill-appearing or toxic-appearing.   Cardiovascular:      Rate and Rhythm: Normal rate and regular rhythm.   Pulmonary:      Effort: Pulmonary effort is normal.      Breath sounds: Normal breath sounds.   Neurological:      Mental Status: She is alert. Mental status is at baseline.   Psychiatric:         Mood and Affect: Mood normal.         Thought Content: Thought content normal.         Judgment: Judgment normal.         Procedures    PHQ-9 Total Score:      Assessment / Plan      Assessment/Plan:   Assessment & Plan  Anxiety  Stable.  Continue current management.  Medication refilled.  Orders:    PARoxetine (PAXIL) 20 MG tablet; Take 1 tablet by mouth Daily.    Breast cancer screening by mammogram  Mammogram ordered.  Orders:    Mammo Screening Digital Tomosynthesis Bilateral With CAD; Future    Menopause    Orders:    DEXA Bone Density Axial; Future    Subacute cough  Suspect cough is secondary to drainage.  Lungs are clear today.  I recommend adding an over-the-counter antihistamine.   "     Acquired hypothyroidism  Followed by endocrinology.  I recommend patient bring a copy of her labs for our records.       Prediabetes  Followed by endocrinology.       Mixed hyperlipidemia  Traditionally has been managed by endocrinology.  I recommend patient bring a copy of labs for our records.         Bronchiectasis without acute exacerbation  Stable.  Followed by pulmonology.                     Follow Up:   Return in about 6 months (around 7/17/2025) for Annual physical, Medicare Wellness.      JOSE DAVID Michelle MD  [facility]

## 2025-01-17 NOTE — ASSESSMENT & PLAN NOTE
Stable.  Continue current management.  Medication refilled.  Orders:    PARoxetine (PAXIL) 20 MG tablet; Take 1 tablet by mouth Daily.     normal...

## 2025-03-27 ENCOUNTER — RESULTS FOLLOW-UP (OUTPATIENT)
Dept: FAMILY MEDICINE CLINIC | Facility: CLINIC | Age: 80
End: 2025-03-27
Payer: MEDICARE

## 2025-06-20 RX ORDER — ATORVASTATIN CALCIUM 20 MG/1
20 TABLET, FILM COATED ORAL NIGHTLY
Qty: 30 TABLET | Refills: 0 | Status: SHIPPED | OUTPATIENT
Start: 2025-06-20

## 2025-06-20 NOTE — TELEPHONE ENCOUNTER
Caller: Susie Mancia    Relationship: Self    Best call back number:   Telephone Information:   Mobile [phone]        Requested Prescriptions:   Requested Prescriptions     Pending Prescriptions Disp Refills    atorvastatin (LIPITOR) 20 MG tablet 90 tablet      Sig: Take 1 tablet by mouth Every Night.        Pharmacy where request should be sent: [facility] [address] [place] [address] [phone] [facility] [phone] FX     Last office visit with prescribing clinician: 1/17/2025   Last telemedicine visit with prescribing clinician: Visit date not found   Next office visit with prescribing clinician: 7/18/2025     Additional details provided by patient:     Does the patient have less than a 3 day supply:  [x] Yes  [] No    Would you like a call back once the refill request has been completed: [] Yes [x] No    If the office needs to give you a call back, can they leave a voicemail: [] Yes [x] No    Christian Paulino   06/20/25 12:50 EDT          1.73 IMPROVE-DD Application Not Available

## 2025-07-18 ENCOUNTER — OFFICE VISIT (OUTPATIENT)
Dept: FAMILY MEDICINE CLINIC | Facility: CLINIC | Age: 80
End: 2025-07-18
Payer: MEDICARE

## 2025-07-18 VITALS
SYSTOLIC BLOOD PRESSURE: 130 MMHG | WEIGHT: 132.3 LBS | HEIGHT: 60 IN | BODY MASS INDEX: 25.97 KG/M2 | HEART RATE: 73 BPM | OXYGEN SATURATION: 96 % | DIASTOLIC BLOOD PRESSURE: 76 MMHG

## 2025-07-18 DIAGNOSIS — Z00.00 MEDICARE ANNUAL WELLNESS VISIT, SUBSEQUENT: Primary | ICD-10-CM

## 2025-07-18 DIAGNOSIS — Z00.00 ANNUAL PHYSICAL EXAM: ICD-10-CM

## 2025-07-18 DIAGNOSIS — E78.2 MIXED HYPERLIPIDEMIA: ICD-10-CM

## 2025-07-18 DIAGNOSIS — E03.9 ACQUIRED HYPOTHYROIDISM: ICD-10-CM

## 2025-07-18 DIAGNOSIS — E55.9 VITAMIN D DEFICIENCY: ICD-10-CM

## 2025-07-18 DIAGNOSIS — R73.03 PREDIABETES: ICD-10-CM

## 2025-07-18 RX ORDER — ATORVASTATIN CALCIUM 20 MG/1
20 TABLET, FILM COATED ORAL NIGHTLY
Qty: 90 TABLET | Refills: 1 | Status: SHIPPED | OUTPATIENT
Start: 2025-07-18

## 2025-07-18 RX ORDER — AZELASTINE HYDROCHLORIDE 137 UG/1
SPRAY, METERED NASAL
COMMUNITY
Start: 2025-07-02

## 2025-07-18 NOTE — ASSESSMENT & PLAN NOTE
Currently on atorvastatin.  Will refill and check lipid panel.    Orders:    atorvastatin (LIPITOR) 20 MG tablet; Take 1 tablet by mouth Every Night.    Lipid Panel

## 2025-07-18 NOTE — PROGRESS NOTES
Subjective   The ABCs of the Annual Wellness Visit  Medicare Wellness Visit      Susie Mancia is a 80 y.o. patient who presents for a Medicare Wellness Visit.    The following portions of the patient's history were reviewed and   updated as appropriate: allergies, current medications, past family history, past medical history, past social history, past surgical history, and problem list.    Compared to one year ago, the patient's physical   health is the same.  Compared to one year ago, the patient's mental   health is worse.    Recent Hospitalizations:  She was not admitted to the hospital during the last year.     Current Medical Providers:  Patient Care Team:  Isidro Michelle MD as PCP - General (Family Medicine)  Sheldon Yuen MD as Consulting Physician (Pulmonary Disease)    Outpatient Medications Prior to Visit   Medication Sig Dispense Refill    aspirin 81 MG EC tablet Take 1 tablet by mouth Daily.      atorvastatin (LIPITOR) 20 MG tablet Take 1 tablet by mouth Every Night. 30 tablet 0    Azelastine HCl 137 MCG/SPRAY solution use 1 spray(s) in each nostril twice daily      azithromycin (Zithromax) 250 MG tablet Take 1 tablet by mouth 3 (Three) Times a Week. 36 tablet 3    fluticasone (FLONASE) 50 MCG/ACT nasal spray USE 1 SPRAY(S) IN EACH NOSTRIL TWICE DAILY      levothyroxine (SYNTHROID, LEVOTHROID) 50 MCG tablet TAKE 1 TABLET BY MOUTH ONCE DAILY BEFORE BREAKFAST      PARoxetine (PAXIL) 20 MG tablet Take 1 tablet by mouth Daily. 90 tablet 3     Facility-Administered Medications Prior to Visit   Medication Dose Route Frequency Provider Last Rate Last Admin    ondansetron ODT (ZOFRAN-ODT) disintegrating tablet 4 mg  4 mg Translingual Once Renaldo Alonzo MD         No opioid medication identified on active medication list. I have reviewed chart for other potential  high risk medication/s and harmful drug interactions in the elderly.      Aspirin is on active medication list. Aspirin use  "is indicated based on review of current medical condition/s. Pros and cons of this therapy have been discussed today. Benefits of this medication outweigh potential harm.  Patient has been encouraged to continue taking this medication.  .      Patient Active Problem List   Diagnosis    Anxiety    Chest pain    Gastroesophageal reflux disease    Hyperlipidemia    Hypothyroidism    Irritable bowel syndrome    Pancreas divisum    Prediabetes    Chronic recurrent pancreatitis    Seasonal allergies    Encounter for wellness examination in adult    Encounter for screening mammogram for malignant neoplasm of breast    Vitamin D deficiency    Seasonal allergic rhinitis due to pollen    Fluid level behind tympanic membrane of right ear    Asymptomatic stenosis of right carotid artery    Carotid stenosis    Generalized muscle weakness    Family history of chronic ischemic heart disease    Shortness of breath    Abnormal EKG    History of right-sided carotid endarterectomy    Bronchiectasis without acute exacerbation    Chronic cough     Advance Care Planning Advance Directive is not on file.  ACP discussion was held with the patient during this visit. Patient has an advance directive (not in EMR), copy requested.            Objective   Vitals:    07/18/25 1419   BP: 130/76   BP Location: Left arm   Patient Position: Sitting   Cuff Size: Large Adult   Pulse: 73   SpO2: 96%   Weight: 60 kg (132 lb 4.8 oz)   Height: 152.4 cm (60\")   PainSc: 0-No pain       Estimated body mass index is 25.84 kg/m² as calculated from the following:    Height as of this encounter: 152.4 cm (60\").    Weight as of this encounter: 60 kg (132 lb 4.8 oz).                Does the patient have evidence of cognitive impairment? No                                                                                                Health  Risk Assessment    Smoking Status:  Social History     Tobacco Use   Smoking Status Former    Current packs/day: 0.00    " Average packs/day: 0.3 packs/day for 3.0 years (0.8 ttl pk-yrs)    Types: Cigarettes    Start date: 1963    Quit date: 1966    Years since quittin.5    Passive exposure: Past   Smokeless Tobacco Never   Tobacco Comments    2-3 per day ( not everyday)     Alcohol Consumption:  Social History     Substance and Sexual Activity   Alcohol Use Never       Fall Risk Screen  [facility], and patient is at LOW risk for falls.Assessment completed on:2025    Depression Screening   Little interest or pleasure in doing things? Not at all   Feeling down, depressed, or hopeless? Not at all   PHQ-2 Total Score 0      Health Habits and Functional and Cognitive Screenin/18/2025     2:20 PM   Functional & Cognitive Status   Do you have difficulty preparing food and eating? No   Do you have difficulty bathing yourself, getting dressed or grooming yourself? No   Do you have difficulty using the toilet? No   Do you have difficulty moving around from place to place? No   Do you have trouble with steps or getting out of a bed or a chair? No   Current Diet Well Balanced Diet   Dental Exam Up to date   Eye Exam Not up to date   Exercise (times per week) 7 times per week   Current Exercises Include House Cleaning;Yard Work;Gardening   Do you need help using the phone?  No   Are you deaf or do you have serious difficulty hearing?  No   Do you need help to go to places out of walking distance? No   Do you need help shopping? No   Do you need help preparing meals?  No   Do you need help with housework?  No   Do you need help with laundry? No   Do you need help taking your medications? No   Do you need help managing money? No   Do you ever drive or ride in a car without wearing a seat belt? No   Have you felt unusual fatigue (could be tiredness), stress, anger or loneliness in the last month? No   Who do you live with? Spouse   If you need help, do you have trouble finding someone available  to you? No   Have you been bothered in the last four weeks by sexual problems? No   Do you have difficulty concentrating, remembering or making decisions? No           Age-appropriate Screening Schedule:  Refer to the list below for future screening recommendations based on patient's age, sex and/or medical conditions. Orders for these recommended tests are listed in the plan section. The patient has been provided with a written plan.    Health Maintenance List  Health Maintenance   Topic Date Due    HEPATITIS C SCREENING  Never done    ANNUAL WELLNESS VISIT  08/15/2023    LIPID PANEL  05/22/2025    COVID-19 Vaccine (6 - 2024-25 season) 01/01/2026 (Originally 9/1/2024)    TDAP/TD VACCINES (1 - Tdap) 01/14/2026 (Originally 3/19/1964)    RSV Vaccine - Adults (1 - 1-dose 75+ series) 01/17/2026 (Originally 3/19/2021)    ZOSTER VACCINE (1 of 2) 01/17/2026 (Originally 3/19/1996)    INFLUENZA VACCINE  10/01/2025    DXA SCAN  03/27/2027    Pneumococcal Vaccine 50+  Completed    MAMMOGRAM  Discontinued    COLORECTAL CANCER SCREENING  Discontinued                                                                                                                                                CMS Preventative Services Quick Reference  Risk Factors Identified During Encounter  Immunizations Discussed/Encouraged: RSV (Respiratory Syncytial Virus)    The above risks/problems have been discussed with the patient.  Pertinent information has been shared with the patient in the After Visit Summary.  An After Visit Summary and PPPS were made available to the patient.    Follow Up:   Next Medicare Wellness visit to be scheduled in 1 year.         Additional E&M Note during same encounter follows:  Patient has additional, significant, and separately identifiable condition(s)/problem(s) that require work above and beyond the Medicare Wellness Visit     Chief Complaint  Medicare Wellness-subsequent (Pt presents for a subsequent medicare  "wellness exam. Pt med hx includes HLD, hypothyroidism, GERD and anxiety. Updated med list)    Subjective   HPI  Susie is also being seen today for additional medical problem/s.  Patient would like refills on some medications and follow-up lab work.  No other complaints today.  She has lost 2 siblings here in the past few months.  A third sibling was in a bad accident causing fracture to his neck.  She has been stressed over the past few months due to sibling concerns.  Review of Systems   All other systems reviewed and are negative.             Objective   Vital Signs:  /76 (BP Location: Left arm, Patient Position: Sitting, Cuff Size: Large Adult)   Pulse 73   Ht 152.4 cm (60\")   Wt 60 kg (132 lb 4.8 oz)   SpO2 96%   BMI 25.84 kg/m²   Physical Exam  Vitals and nursing note reviewed.   Constitutional:       General: She is not in acute distress.     Appearance: Normal appearance. She is not ill-appearing or toxic-appearing.   HENT:      Head: Normocephalic and atraumatic.      Right Ear: Tympanic membrane normal.      Left Ear: Tympanic membrane normal.      Mouth/Throat:      Mouth: Mucous membranes are moist.      Pharynx: Oropharynx is clear.   Eyes:      Extraocular Movements: Extraocular movements intact.      Pupils: Pupils are equal, round, and reactive to light.   Cardiovascular:      Rate and Rhythm: Normal rate and regular rhythm.   Pulmonary:      Effort: Pulmonary effort is normal.      Breath sounds: Normal breath sounds.   Musculoskeletal:      Cervical back: Neck supple.   Skin:     Findings: Rash present.   Neurological:      General: No focal deficit present.      Mental Status: She is alert. Mental status is at baseline.   Psychiatric:         Mood and Affect: Mood normal.         Thought Content: Thought content normal.         Judgment: Judgment normal.                    Assessment and Plan      Medicare annual wellness visit, subsequent  Medicare questionnaire reviewed.       Annual " physical exam  Routine labs ordered.  Patient is up-to-date on mammogram and DEXA scan       Mixed hyperlipidemia  Currently on atorvastatin.  Will refill and check lipid panel.    Orders:    atorvastatin (LIPITOR) 20 MG tablet; Take 1 tablet by mouth Every Night.    Lipid Panel    Acquired hypothyroidism  Currently on levothyroxine 50 mcg.  Will follow-up TSH.  Orders:    CBC & Differential    TSH    Prediabetes    Orders:    Hemoglobin A1c    Comprehensive Metabolic Panel    CBC & Differential    Vitamin D deficiency    Orders:    Vitamin D,25-Hydroxy            Follow Up   No follow-ups on file.  Patient was given instructions and counseling regarding her condition or for health maintenance advice. Please see specific information pulled into the AVS if appropriate.

## 2025-07-19 LAB
25(OH)D3+25(OH)D2 SERPL-MCNC: 22.9 NG/ML (ref 30–100)
ALBUMIN SERPL-MCNC: 4.5 G/DL (ref 3.8–4.8)
ALP SERPL-CCNC: 109 IU/L (ref 44–121)
ALT SERPL-CCNC: 19 IU/L (ref 0–32)
AST SERPL-CCNC: 35 IU/L (ref 0–40)
BASOPHILS # BLD AUTO: 0 X10E3/UL (ref 0–0.2)
BASOPHILS NFR BLD AUTO: 0 %
BILIRUB SERPL-MCNC: 0.3 MG/DL (ref 0–1.2)
BUN SERPL-MCNC: 15 MG/DL (ref 8–27)
BUN/CREAT SERPL: 17 (ref 12–28)
CALCIUM SERPL-MCNC: 10 MG/DL (ref 8.7–10.3)
CHLORIDE SERPL-SCNC: 101 MMOL/L (ref 96–106)
CHOLEST SERPL-MCNC: 229 MG/DL (ref 100–199)
CO2 SERPL-SCNC: 24 MMOL/L (ref 20–29)
CREAT SERPL-MCNC: 0.86 MG/DL (ref 0.57–1)
EGFRCR SERPLBLD CKD-EPI 2021: 69 ML/MIN/1.73
EOSINOPHIL # BLD AUTO: 0.5 X10E3/UL (ref 0–0.4)
EOSINOPHIL NFR BLD AUTO: 8 %
ERYTHROCYTE [DISTWIDTH] IN BLOOD BY AUTOMATED COUNT: 13.4 % (ref 11.7–15.4)
GLOBULIN SER CALC-MCNC: 2.9 G/DL (ref 1.5–4.5)
GLUCOSE SERPL-MCNC: 75 MG/DL (ref 70–99)
HBA1C MFR BLD: 6 % (ref 4.8–5.6)
HCT VFR BLD AUTO: 42.3 % (ref 34–46.6)
HDLC SERPL-MCNC: 49 MG/DL
HGB BLD-MCNC: 13.4 G/DL (ref 11.1–15.9)
IMM GRANULOCYTES # BLD AUTO: 0 X10E3/UL (ref 0–0.1)
IMM GRANULOCYTES NFR BLD AUTO: 0 %
LDLC SERPL CALC-MCNC: 129 MG/DL (ref 0–99)
LYMPHOCYTES # BLD AUTO: 2 X10E3/UL (ref 0.7–3.1)
LYMPHOCYTES NFR BLD AUTO: 29 %
MCH RBC QN AUTO: 30.1 PG (ref 26.6–33)
MCHC RBC AUTO-ENTMCNC: 31.7 G/DL (ref 31.5–35.7)
MCV RBC AUTO: 95 FL (ref 79–97)
MONOCYTES # BLD AUTO: 0.5 X10E3/UL (ref 0.1–0.9)
MONOCYTES NFR BLD AUTO: 8 %
NEUTROPHILS # BLD AUTO: 3.7 X10E3/UL (ref 1.4–7)
NEUTROPHILS NFR BLD AUTO: 55 %
PLATELET # BLD AUTO: 281 X10E3/UL (ref 150–450)
POTASSIUM SERPL-SCNC: 4.3 MMOL/L (ref 3.5–5.2)
PROT SERPL-MCNC: 7.4 G/DL (ref 6–8.5)
RBC # BLD AUTO: 4.45 X10E6/UL (ref 3.77–5.28)
SODIUM SERPL-SCNC: 140 MMOL/L (ref 134–144)
TRIGL SERPL-MCNC: 288 MG/DL (ref 0–149)
TSH SERPL DL<=0.005 MIU/L-ACNC: 2.16 UIU/ML (ref 0.45–4.5)
VLDLC SERPL CALC-MCNC: 51 MG/DL (ref 5–40)
WBC # BLD AUTO: 6.7 X10E3/UL (ref 3.4–10.8)

## 2025-07-21 RX ORDER — AZITHROMYCIN 250 MG/1
250 TABLET, FILM COATED ORAL 3 TIMES WEEKLY
Qty: 36 TABLET | Refills: 3 | Status: SHIPPED | OUTPATIENT
Start: 2025-07-21

## 2025-07-24 ENCOUNTER — EXTERNAL PBMM DATA (OUTPATIENT)
Dept: PHARMACY | Facility: OTHER | Age: 80
End: 2025-07-24
Payer: MEDICARE

## 2025-08-05 ENCOUNTER — POP HEALTH PHARMACY (OUTPATIENT)
Dept: PHARMACY | Facility: OTHER | Age: 80
End: 2025-08-05
Payer: MEDICARE

## 2025-08-18 RX ORDER — LEVOTHYROXINE SODIUM 50 UG/1
50 TABLET ORAL
Qty: 90 TABLET | Refills: 1 | Status: SHIPPED | OUTPATIENT
Start: 2025-08-18

## 2025-08-28 ENCOUNTER — POP HEALTH PHARMACY (OUTPATIENT)
Dept: PHARMACY | Facility: OTHER | Age: 80
End: 2025-08-28
Payer: MEDICARE